# Patient Record
Sex: MALE | Race: WHITE | URBAN - METROPOLITAN AREA
[De-identification: names, ages, dates, MRNs, and addresses within clinical notes are randomized per-mention and may not be internally consistent; named-entity substitution may affect disease eponyms.]

---

## 2017-05-22 ENCOUNTER — OFFICE VISIT (OUTPATIENT)
Dept: FAMILY MEDICINE CLINIC | Age: 52
End: 2017-05-22

## 2017-05-22 VITALS
DIASTOLIC BLOOD PRESSURE: 84 MMHG | BODY MASS INDEX: 26.06 KG/M2 | SYSTOLIC BLOOD PRESSURE: 114 MMHG | HEIGHT: 65 IN | TEMPERATURE: 98.2 F | WEIGHT: 156.4 LBS | HEART RATE: 72 BPM | RESPIRATION RATE: 16 BRPM

## 2017-05-22 DIAGNOSIS — F32.A ANXIETY AND DEPRESSION: ICD-10-CM

## 2017-05-22 DIAGNOSIS — Z12.11 COLON CANCER SCREENING: ICD-10-CM

## 2017-05-22 DIAGNOSIS — F41.9 ANXIETY AND DEPRESSION: ICD-10-CM

## 2017-05-22 DIAGNOSIS — Z11.4 SCREENING FOR HIV (HUMAN IMMUNODEFICIENCY VIRUS): ICD-10-CM

## 2017-05-22 DIAGNOSIS — Z00.00 ANNUAL PHYSICAL EXAM: Primary | ICD-10-CM

## 2017-05-22 DIAGNOSIS — Z23 NEED FOR TDAP VACCINATION: ICD-10-CM

## 2017-05-22 PROCEDURE — 90471 IMMUNIZATION ADMIN: CPT | Performed by: INTERNAL MEDICINE

## 2017-05-22 PROCEDURE — 90715 TDAP VACCINE 7 YRS/> IM: CPT | Performed by: INTERNAL MEDICINE

## 2017-05-22 PROCEDURE — 99396 PREV VISIT EST AGE 40-64: CPT | Performed by: INTERNAL MEDICINE

## 2017-05-22 RX ORDER — DULOXETIN HYDROCHLORIDE 60 MG/1
60 CAPSULE, DELAYED RELEASE ORAL DAILY
COMMUNITY
End: 2022-08-16 | Stop reason: SDUPTHER

## 2017-05-22 RX ORDER — FLUTICASONE PROPIONATE 50 MCG
1 SPRAY, SUSPENSION (ML) NASAL DAILY
Qty: 1 BOTTLE | Refills: 5 | Status: SHIPPED | OUTPATIENT
Start: 2017-05-22

## 2017-05-22 RX ORDER — FLUTICASONE PROPIONATE 50 MCG
1 SPRAY, SUSPENSION (ML) NASAL DAILY
Qty: 3 BOTTLE | Refills: 3 | Status: SHIPPED | OUTPATIENT
Start: 2017-05-22 | End: 2022-08-16

## 2017-05-22 ASSESSMENT — PATIENT HEALTH QUESTIONNAIRE - PHQ9
SUM OF ALL RESPONSES TO PHQ QUESTIONS 1-9: 0
1. LITTLE INTEREST OR PLEASURE IN DOING THINGS: 0
SUM OF ALL RESPONSES TO PHQ9 QUESTIONS 1 & 2: 0
2. FEELING DOWN, DEPRESSED OR HOPELESS: 0

## 2017-05-22 ASSESSMENT — ENCOUNTER SYMPTOMS
GASTROINTESTINAL NEGATIVE: 1
ALLERGIC/IMMUNOLOGIC NEGATIVE: 1
RESPIRATORY NEGATIVE: 1
EYES NEGATIVE: 1

## 2017-05-24 ENCOUNTER — NURSE ONLY (OUTPATIENT)
Dept: FAMILY MEDICINE CLINIC | Age: 52
End: 2017-05-24

## 2017-05-24 DIAGNOSIS — Z11.4 SCREENING FOR HIV (HUMAN IMMUNODEFICIENCY VIRUS): ICD-10-CM

## 2017-05-24 DIAGNOSIS — Z00.00 ANNUAL PHYSICAL EXAM: ICD-10-CM

## 2017-05-24 PROCEDURE — 36415 COLL VENOUS BLD VENIPUNCTURE: CPT | Performed by: INTERNAL MEDICINE

## 2017-05-25 LAB
ALBUMIN SERPL-MCNC: 4.4 G/DL (ref 3.4–5)
ALP BLD-CCNC: 47 U/L (ref 40–129)
ALT SERPL-CCNC: 28 U/L (ref 10–40)
ANION GAP SERPL CALCULATED.3IONS-SCNC: 15 MMOL/L (ref 3–16)
AST SERPL-CCNC: 28 U/L (ref 15–37)
BILIRUB SERPL-MCNC: 0.3 MG/DL (ref 0–1)
BILIRUBIN DIRECT: <0.2 MG/DL (ref 0–0.3)
BILIRUBIN, INDIRECT: NORMAL MG/DL (ref 0–1)
BUN BLDV-MCNC: 23 MG/DL (ref 7–20)
CALCIUM SERPL-MCNC: 8.9 MG/DL (ref 8.3–10.6)
CHLORIDE BLD-SCNC: 102 MMOL/L (ref 99–110)
CHOLESTEROL, TOTAL: 192 MG/DL (ref 0–199)
CO2: 24 MMOL/L (ref 21–32)
CREAT SERPL-MCNC: 1 MG/DL (ref 0.9–1.3)
GFR AFRICAN AMERICAN: >60
GFR NON-AFRICAN AMERICAN: >60
GLUCOSE BLD-MCNC: 98 MG/DL (ref 70–99)
HCT VFR BLD CALC: 42.3 % (ref 40.5–52.5)
HDLC SERPL-MCNC: 55 MG/DL (ref 40–60)
HEMOGLOBIN: 14.1 G/DL (ref 13.5–17.5)
LDL CHOLESTEROL CALCULATED: 117 MG/DL
MCH RBC QN AUTO: 32.7 PG (ref 26–34)
MCHC RBC AUTO-ENTMCNC: 33.4 G/DL (ref 31–36)
MCV RBC AUTO: 97.9 FL (ref 80–100)
PDW BLD-RTO: 12.2 % (ref 12.4–15.4)
PHOSPHORUS: 3.2 MG/DL (ref 2.5–4.9)
PLATELET # BLD: 158 K/UL (ref 135–450)
PMV BLD AUTO: 9.9 FL (ref 5–10.5)
POTASSIUM SERPL-SCNC: 4.1 MMOL/L (ref 3.5–5.1)
RBC # BLD: 4.32 M/UL (ref 4.2–5.9)
SODIUM BLD-SCNC: 141 MMOL/L (ref 136–145)
TOTAL PROTEIN: 6.7 G/DL (ref 6.4–8.2)
TRIGL SERPL-MCNC: 100 MG/DL (ref 0–150)
TSH SERPL DL<=0.05 MIU/L-ACNC: 2.23 UIU/ML (ref 0.27–4.2)
VLDLC SERPL CALC-MCNC: 20 MG/DL
WBC # BLD: 4.2 K/UL (ref 4–11)

## 2017-05-26 LAB — HIV-1 AND HIV-2 ANTIBODIES: NEGATIVE

## 2017-08-07 DIAGNOSIS — Z12.11 SCREENING FOR COLON CANCER: Primary | ICD-10-CM

## 2017-08-23 ENCOUNTER — TELEPHONE (OUTPATIENT)
Dept: SURGERY | Age: 52
End: 2017-08-23

## 2017-08-25 ENCOUNTER — HOSPITAL ENCOUNTER (OUTPATIENT)
Dept: SURGERY | Age: 52
Discharge: HOME | End: 2017-08-25
Attending: SURGERY | Admitting: SURGERY

## 2017-08-25 VITALS
HEART RATE: 72 BPM | TEMPERATURE: 98.7 F | HEIGHT: 65 IN | SYSTOLIC BLOOD PRESSURE: 111 MMHG | DIASTOLIC BLOOD PRESSURE: 52 MMHG | BODY MASS INDEX: 26.16 KG/M2 | OXYGEN SATURATION: 98 % | RESPIRATION RATE: 16 BRPM | WEIGHT: 157 LBS

## 2017-08-25 DIAGNOSIS — Z12.11 ENCOUNTER FOR SCREENING FOR MALIGNANT NEOPLASM OF COLON: ICD-10-CM

## 2017-08-25 RX ORDER — LIDOCAINE HYDROCHLORIDE 10 MG/ML
2 INJECTION, SOLUTION INFILTRATION; PERINEURAL
Status: ACTIVE | OUTPATIENT
Start: 2017-08-25 | End: 2017-08-25

## 2017-08-25 RX ORDER — HYDRALAZINE HYDROCHLORIDE 20 MG/ML
5 INJECTION INTRAMUSCULAR; INTRAVENOUS EVERY 10 MIN PRN
Status: DISCONTINUED | OUTPATIENT
Start: 2017-08-25 | End: 2017-08-26 | Stop reason: HOSPADM

## 2017-08-25 RX ORDER — PROMETHAZINE HYDROCHLORIDE 25 MG/ML
6.25 INJECTION, SOLUTION INTRAMUSCULAR; INTRAVENOUS
Status: ACTIVE | OUTPATIENT
Start: 2017-08-25 | End: 2017-08-25

## 2017-08-25 RX ORDER — METOCLOPRAMIDE HYDROCHLORIDE 5 MG/ML
10 INJECTION INTRAMUSCULAR; INTRAVENOUS
Status: ACTIVE | OUTPATIENT
Start: 2017-08-25 | End: 2017-08-25

## 2017-08-25 RX ORDER — OXYCODONE HYDROCHLORIDE 5 MG/1
10 TABLET ORAL PRN
Status: ACTIVE | OUTPATIENT
Start: 2017-08-25 | End: 2017-08-25

## 2017-08-25 RX ORDER — OXYCODONE HYDROCHLORIDE 5 MG/1
5 TABLET ORAL PRN
Status: ACTIVE | OUTPATIENT
Start: 2017-08-25 | End: 2017-08-25

## 2017-08-25 RX ORDER — LABETALOL HYDROCHLORIDE 5 MG/ML
5 INJECTION, SOLUTION INTRAVENOUS EVERY 10 MIN PRN
Status: DISCONTINUED | OUTPATIENT
Start: 2017-08-25 | End: 2017-08-26 | Stop reason: HOSPADM

## 2017-08-25 RX ORDER — MEPERIDINE HYDROCHLORIDE 50 MG/ML
12.5 INJECTION INTRAMUSCULAR; INTRAVENOUS; SUBCUTANEOUS EVERY 5 MIN PRN
Status: DISCONTINUED | OUTPATIENT
Start: 2017-08-25 | End: 2017-08-26 | Stop reason: HOSPADM

## 2017-08-25 RX ORDER — SODIUM CHLORIDE, SODIUM LACTATE, POTASSIUM CHLORIDE, CALCIUM CHLORIDE 600; 310; 30; 20 MG/100ML; MG/100ML; MG/100ML; MG/100ML
INJECTION, SOLUTION INTRAVENOUS CONTINUOUS
Status: DISCONTINUED | OUTPATIENT
Start: 2017-08-25 | End: 2017-08-26 | Stop reason: HOSPADM

## 2017-08-25 RX ORDER — SODIUM CHLORIDE, SODIUM LACTATE, POTASSIUM CHLORIDE, CALCIUM CHLORIDE 600; 310; 30; 20 MG/100ML; MG/100ML; MG/100ML; MG/100ML
INJECTION, SOLUTION INTRAVENOUS ONCE
Status: COMPLETED | OUTPATIENT
Start: 2017-08-25 | End: 2017-08-25

## 2017-08-25 RX ORDER — LIDOCAINE HYDROCHLORIDE 10 MG/ML
0.1 INJECTION, SOLUTION EPIDURAL; INFILTRATION; INTRACAUDAL; PERINEURAL
Status: ACTIVE | OUTPATIENT
Start: 2017-08-25 | End: 2017-08-25

## 2017-08-25 RX ORDER — MORPHINE SULFATE 2 MG/ML
1 INJECTION, SOLUTION INTRAMUSCULAR; INTRAVENOUS EVERY 5 MIN PRN
Status: DISCONTINUED | OUTPATIENT
Start: 2017-08-25 | End: 2017-08-26 | Stop reason: HOSPADM

## 2017-08-25 RX ORDER — DIPHENHYDRAMINE HYDROCHLORIDE 50 MG/ML
12.5 INJECTION INTRAMUSCULAR; INTRAVENOUS
Status: ACTIVE | OUTPATIENT
Start: 2017-08-25 | End: 2017-08-25

## 2017-08-25 RX ADMIN — SODIUM CHLORIDE, SODIUM LACTATE, POTASSIUM CHLORIDE, CALCIUM CHLORIDE: 600; 310; 30; 20 INJECTION, SOLUTION INTRAVENOUS at 12:38

## 2017-08-25 ASSESSMENT — PAIN SCALES - GENERAL
PAINLEVEL_OUTOF10: 0

## 2017-08-25 ASSESSMENT — PAIN - FUNCTIONAL ASSESSMENT: PAIN_FUNCTIONAL_ASSESSMENT: 0-10

## 2017-08-30 ENCOUNTER — TELEPHONE (OUTPATIENT)
Dept: SURGERY | Age: 52
End: 2017-08-30

## 2017-12-05 ENCOUNTER — OFFICE VISIT (OUTPATIENT)
Dept: FAMILY MEDICINE CLINIC | Age: 52
End: 2017-12-05

## 2017-12-05 VITALS
SYSTOLIC BLOOD PRESSURE: 112 MMHG | WEIGHT: 163.4 LBS | RESPIRATION RATE: 16 BRPM | OXYGEN SATURATION: 97 % | HEART RATE: 78 BPM | DIASTOLIC BLOOD PRESSURE: 64 MMHG | HEIGHT: 65 IN | BODY MASS INDEX: 27.22 KG/M2

## 2017-12-05 DIAGNOSIS — H93.13 TINNITUS OF BOTH EARS: ICD-10-CM

## 2017-12-05 PROCEDURE — 99213 OFFICE O/P EST LOW 20 MIN: CPT | Performed by: INTERNAL MEDICINE

## 2017-12-05 ASSESSMENT — ENCOUNTER SYMPTOMS
SINUS PRESSURE: 0
SORE THROAT: 0
FACIAL SWELLING: 0
VOICE CHANGE: 0
GASTROINTESTINAL NEGATIVE: 1
SINUS PAIN: 0
RESPIRATORY NEGATIVE: 1
TROUBLE SWALLOWING: 0
ALLERGIC/IMMUNOLOGIC NEGATIVE: 1
RHINORRHEA: 0

## 2017-12-05 NOTE — PROGRESS NOTES
Subjective:      Patient ID: Momo Garcia is a 50 y.o. male. HPI  Tinnitus of both ears  Chronic bilateral. Comes and goes in severity. May be some hearing loss associated w/ Tinnitus. No problems w/ balance. Now has to early teen children w/ Tinnitus. Past Medical History:   Diagnosis Date    Allergic rhinitis     Cancer (HCC)     basal cell skin    PONV (postoperative nausea and vomiting)     Psoriasis      Current Outpatient Prescriptions   Medication Sig Dispense Refill    fluticasone (FLONASE) 50 MCG/ACT nasal spray 1 spray by Nasal route daily (Patient taking differently: 1 spray by Nasal route as needed ) 1 Bottle 5    fluticasone (FLONASE) 50 MCG/ACT nasal spray 1 spray by Nasal route daily (Patient taking differently: 1 spray by Nasal route as needed ) 3 Bottle 3    DULoxetine (CYMBALTA) 60 MG extended release capsule Take 60 mg by mouth daily      econazole nitrate 1 % cream Apply topically daily Apply topically daily.  Hydrocortisone Butyrate 0.1 % CREA Apply topically daily       naphazoline-pheniramine (NAPHCON-A) 0.025-0.3 % ophthalmic solution Place 1 drop into both eyes as needed       cetirizine (ZYRTEC) 10 MG tablet Take 10 mg by mouth daily      Cholecalciferol (VITAMIN D) 2000 UNITS CAPS capsule Take by mouth daily       Multiple Vitamins-Minerals (THERAPEUTIC MULTIVITAMIN-MINERALS) tablet Take 1 tablet by mouth daily      Potassium 99 MG TABS Take by mouth daily        No current facility-administered medications for this visit. No Known Allergies  Social History   Substance Use Topics    Smoking status: Never Smoker    Smokeless tobacco: Never Used    Alcohol use No     Family History   Problem Relation Age of Onset    Cancer Father      brain    Heart Disease Father     Arrhythmia Father     Other Maternal Uncle      schizophrenia    Other Maternal Grandfather      schizophrenia    Colon Cancer Mother        Review of Systems   Constitutional: Negative. HENT: Positive for hearing loss and tinnitus. Negative for congestion, dental problem, drooling, ear discharge, ear pain, facial swelling, mouth sores, nosebleeds, postnasal drip, rhinorrhea, sinus pain, sinus pressure, sneezing, sore throat, trouble swallowing and voice change. Respiratory: Negative. Cardiovascular: Negative. Gastrointestinal: Negative. Endocrine: Negative. Genitourinary: Negative. Musculoskeletal: Negative. Allergic/Immunologic: Negative. Neurological: Negative. Hematological: Negative. Psychiatric/Behavioral: Negative. Vitals:    12/05/17 1618   BP: 112/64   Pulse: 78   Resp: 16   SpO2: 97%   Weight: 163 lb 6.4 oz (74.1 kg)   Height: 5' 5\" (1.651 m)     Objective:   Physical Exam   Constitutional: He is oriented to person, place, and time. Vital signs are normal. He appears well-developed and well-nourished. No distress. HENT:   Head: Normocephalic and atraumatic. Right Ear: External ear normal.   Left Ear: External ear normal.   Nose: Nose normal.   Mouth/Throat: Oropharynx is clear and moist. No oropharyngeal exudate. Eyes: Conjunctivae and EOM are normal. Pupils are equal, round, and reactive to light. Neck: Trachea normal, normal range of motion, full passive range of motion without pain and phonation normal. Neck supple. Normal carotid pulses, no hepatojugular reflux and no JVD present. Carotid bruit is not present. No thyroid mass and no thyromegaly present. Cardiovascular: Normal rate, regular rhythm, normal heart sounds, intact distal pulses and normal pulses. No extrasystoles are present. PMI is not displaced. Exam reveals no gallop and no friction rub. No murmur heard. Pulses:       Carotid pulses are 2+ on the right side, and 2+ on the left side. Radial pulses are 2+ on the right side, and 2+ on the left side. Femoral pulses are 2+ on the right side, and 2+ on the left side.        Popliteal pulses are 2+ on the right side, and 2+ on the left side. Dorsalis pedis pulses are 2+ on the right side, and 2+ on the left side. Posterior tibial pulses are 2+ on the right side, and 2+ on the left side. Pulmonary/Chest: Effort normal and breath sounds normal. No accessory muscle usage. No apnea, no tachypnea and no bradypnea. No respiratory distress. He has no decreased breath sounds. He has no wheezes. He has no rhonchi. He has no rales. Abdominal: Normal appearance and normal aorta. There is no hepatosplenomegaly. There is no CVA tenderness. No hernia. Hernia confirmed negative in the ventral area. Neurological: He is alert and oriented to person, place, and time. He has normal strength. He is not disoriented. He displays no atrophy and no tremor. No cranial nerve deficit or sensory deficit. He exhibits normal muscle tone. He displays a negative Romberg sign. Coordination normal.   Skin: Skin is warm, dry and intact. No abrasion and no rash noted. He is not diaphoretic. No cyanosis. No pallor. Nails show no clubbing. Psychiatric: He has a normal mood and affect. His speech is normal and behavior is normal. Judgment and thought content normal. Cognition and memory are normal.       Assessment:      Problem   Tinnitus of Both Ears. Chronic. Plan:      All above problems reviewed and the found to be unchanged except for the following :     Tinnitus of Both Ears. Will refer to ENT.

## 2017-12-05 NOTE — ASSESSMENT & PLAN NOTE
Chronic bilateral. Comes and goes in severity. May be some hearing loss associated w/ Tinnitus. No problems w/ balance. Now has to early teen children w/ Tinnitus.

## 2018-09-26 PROBLEM — Z00.00 ANNUAL PHYSICAL EXAM: Status: RESOLVED | Noted: 2017-05-22 | Resolved: 2018-09-26

## 2018-12-07 ENCOUNTER — OFFICE VISIT (OUTPATIENT)
Dept: FAMILY MEDICINE CLINIC | Age: 53
End: 2018-12-07
Payer: COMMERCIAL

## 2018-12-07 VITALS
HEIGHT: 65 IN | WEIGHT: 158 LBS | TEMPERATURE: 98.2 F | HEART RATE: 60 BPM | SYSTOLIC BLOOD PRESSURE: 112 MMHG | OXYGEN SATURATION: 98 % | DIASTOLIC BLOOD PRESSURE: 78 MMHG | RESPIRATION RATE: 16 BRPM | BODY MASS INDEX: 26.33 KG/M2

## 2018-12-07 DIAGNOSIS — J01.00 ACUTE NON-RECURRENT MAXILLARY SINUSITIS: ICD-10-CM

## 2018-12-07 PROCEDURE — 99212 OFFICE O/P EST SF 10 MIN: CPT | Performed by: INTERNAL MEDICINE

## 2018-12-07 ASSESSMENT — ENCOUNTER SYMPTOMS
COUGH: 1
SINUS PRESSURE: 1
RHINORRHEA: 1
GASTROINTESTINAL NEGATIVE: 1

## 2018-12-07 ASSESSMENT — PATIENT HEALTH QUESTIONNAIRE - PHQ9
SUM OF ALL RESPONSES TO PHQ9 QUESTIONS 1 & 2: 0
2. FEELING DOWN, DEPRESSED OR HOPELESS: 0
SUM OF ALL RESPONSES TO PHQ QUESTIONS 1-9: 0
1. LITTLE INTEREST OR PLEASURE IN DOING THINGS: 0
SUM OF ALL RESPONSES TO PHQ QUESTIONS 1-9: 0

## 2018-12-21 RX ORDER — PROMETHAZINE HYDROCHLORIDE 25 MG/1
25 TABLET ORAL EVERY 8 HOURS PRN
Qty: 10 TABLET | Refills: 0 | Status: SHIPPED | OUTPATIENT
Start: 2018-12-21 | End: 2021-02-26

## 2021-02-26 ENCOUNTER — OFFICE VISIT (OUTPATIENT)
Dept: FAMILY MEDICINE CLINIC | Age: 56
End: 2021-02-26
Payer: COMMERCIAL

## 2021-02-26 VITALS
HEART RATE: 77 BPM | HEIGHT: 65 IN | BODY MASS INDEX: 23.82 KG/M2 | WEIGHT: 143 LBS | SYSTOLIC BLOOD PRESSURE: 118 MMHG | OXYGEN SATURATION: 98 % | RESPIRATION RATE: 16 BRPM | TEMPERATURE: 97.3 F | DIASTOLIC BLOOD PRESSURE: 60 MMHG

## 2021-02-26 DIAGNOSIS — M77.11 RIGHT LATERAL EPICONDYLITIS: ICD-10-CM

## 2021-02-26 DIAGNOSIS — G89.29 CHRONIC RIGHT SHOULDER PAIN: ICD-10-CM

## 2021-02-26 DIAGNOSIS — R53.83 FATIGUE, UNSPECIFIED TYPE: ICD-10-CM

## 2021-02-26 DIAGNOSIS — F32.A ANXIETY AND DEPRESSION: ICD-10-CM

## 2021-02-26 DIAGNOSIS — J30.1 SEASONAL ALLERGIC RHINITIS DUE TO POLLEN: ICD-10-CM

## 2021-02-26 DIAGNOSIS — M25.511 CHRONIC RIGHT SHOULDER PAIN: ICD-10-CM

## 2021-02-26 DIAGNOSIS — Z11.59 NEED FOR HEPATITIS C SCREENING TEST: ICD-10-CM

## 2021-02-26 DIAGNOSIS — Z12.5 SCREENING FOR PROSTATE CANCER: ICD-10-CM

## 2021-02-26 DIAGNOSIS — F41.9 ANXIETY AND DEPRESSION: ICD-10-CM

## 2021-02-26 DIAGNOSIS — E78.00 PURE HYPERCHOLESTEROLEMIA: ICD-10-CM

## 2021-02-26 DIAGNOSIS — Z00.00 ANNUAL PHYSICAL EXAM: Primary | ICD-10-CM

## 2021-02-26 DIAGNOSIS — L40.9 PSORIASIS: ICD-10-CM

## 2021-02-26 PROCEDURE — 99396 PREV VISIT EST AGE 40-64: CPT | Performed by: INTERNAL MEDICINE

## 2021-02-26 SDOH — ECONOMIC STABILITY: TRANSPORTATION INSECURITY
IN THE PAST 12 MONTHS, HAS LACK OF TRANSPORTATION KEPT YOU FROM MEETINGS, WORK, OR FROM GETTING THINGS NEEDED FOR DAILY LIVING?: NO

## 2021-02-26 SDOH — ECONOMIC STABILITY: TRANSPORTATION INSECURITY
IN THE PAST 12 MONTHS, HAS THE LACK OF TRANSPORTATION KEPT YOU FROM MEDICAL APPOINTMENTS OR FROM GETTING MEDICATIONS?: NO

## 2021-02-26 SDOH — ECONOMIC STABILITY: INCOME INSECURITY: HOW HARD IS IT FOR YOU TO PAY FOR THE VERY BASICS LIKE FOOD, HOUSING, MEDICAL CARE, AND HEATING?: NOT HARD AT ALL

## 2021-02-26 ASSESSMENT — ENCOUNTER SYMPTOMS
EYES NEGATIVE: 1
RESPIRATORY NEGATIVE: 1
ALLERGIC/IMMUNOLOGIC NEGATIVE: 1
GASTROINTESTINAL NEGATIVE: 1

## 2021-02-26 NOTE — ASSESSMENT & PLAN NOTE
Prostate: today  Colonoscopy: 8/2018. DEXA: na  Eye: past due. Hearing: passed in office. Immunization: up to date.  Rx for Shingrix   MMSE: na  Get Up and Go: na  Tob: nonsmoker/never  ETOH: rare  Caffeine: moderate  Cardiac Risk Assessment: labs pending  Living will: no  Medical power of : no

## 2021-02-26 NOTE — PATIENT INSTRUCTIONS
Annual Physical Exam. shingrix vaccine in 1 month. Chronic Right Shoulder Pain. Good ROM w/o pain today. To ORtho if worsens. Right Lateral Epicondylitis. Mild. Wear protective band, use Ice and Tumeric. Anxiety and Depression. Continue present meds. Call if new c/o. Seasonal Allergic Rhinitis Due to Pollen. Claritin, Neti pot, Flonase as needed. Psoriasis. Continue topical treatment. To Derm as scheduled. Prostate: today  Colonoscopy: 8/2018. DEXA: na  Eye: past due. Hearing: passed in office. Immunization: up to date.  Rx for Shingrix   MMSE: na  Get Up and Go: na  Tob: nonsmoker/never  ETOH: rare  Caffeine: moderate  Cardiac Risk Assessment: labs pending  Living will: no  Medical power of : no

## 2021-02-26 NOTE — PROGRESS NOTES
Subjective:      Patient ID: Philip Fontana is a 49 y.o. male. HPI  Annual physical exam  Prostate: today  Colonoscopy: 8/2018. DEXA: na  Eye: past due. Hearing: passed in office. Immunization: up to date. Rx for Shingrix   MMSE: na  Get Up and Go: na  Tob: nonsmoker/never  ETOH: rare  Caffeine: moderate  Cardiac Risk Assessment: labs pending  Living will: no  Medical power of : no    Chronic right shoulder pain  Impingement symptoms. Has seen Chiropractor. Doing exercise/stretching. Comes and goes. Started ~2 yrs ago. Has been some better lately w/ Gx. Right lateral epicondylitis  Started several months ago. No real change. Seasonal allergic rhinitis due to pollen  Spring and fall. Does well w/ present OTCs. Anxiety and depression  Stable . Sees Psych. No recent chx in med. Psoriasis  Stable. No recent chx in med. Rectum, penis, and fingernails. Review of Systems   Constitutional: Positive for fatigue. HENT: Negative. Eyes: Negative. Respiratory: Negative. Cardiovascular: Negative. Gastrointestinal: Negative. Endocrine: Negative. Genitourinary: Negative. Musculoskeletal: Positive for arthralgias and myalgias. Skin: Negative. Allergic/Immunologic: Negative. Neurological: Negative. Hematological: Negative. Psychiatric/Behavioral: Positive for dysphoric mood. Vitals:    02/26/21 1404 02/26/21 1439   BP: 104/70 118/60   Pulse: 77    Resp: 16    Temp: 97.3 °F (36.3 °C)    SpO2: 98%    Weight: 143 lb (64.9 kg)    Height: 5' 5\" (1.651 m)      Objective:   Physical Exam  Constitutional:       General: He is not in acute distress. Appearance: Normal appearance. He is well-developed and normal weight. He is not ill-appearing, toxic-appearing or diaphoretic. HENT:      Head: Normocephalic and atraumatic. Right Ear: Hearing, tympanic membrane, ear canal and external ear normal. There is no impacted cerumen.       Left Ear: Hearing, tympanic Pulmonary:      Effort: Pulmonary effort is normal. No tachypnea, bradypnea, accessory muscle usage or respiratory distress. Breath sounds: Normal breath sounds. No stridor. No decreased breath sounds, wheezing, rhonchi or rales. Chest:      Chest wall: No tenderness. Abdominal:      General: Bowel sounds are normal. There is no distension or abdominal bruit. Palpations: Abdomen is soft. There is no shifting dullness, fluid wave, mass or pulsatile mass. Tenderness: There is no abdominal tenderness. There is no right CVA tenderness, left CVA tenderness, guarding or rebound. Hernia: No hernia is present. There is no hernia in the ventral area or left inguinal area. Genitourinary:     Penis: Normal. No tenderness. Testes: Normal. Cremasteric reflex is present. Prostate: Normal.      Rectum: Normal. Guaiac result negative. Musculoskeletal: Normal range of motion. General: Tenderness (R shoulder and elbow mild discomfort.) present. No swelling, deformity or signs of injury. Right lower leg: No edema. Left lower leg: No edema. Lymphadenopathy:      Head:      Right side of head: No submental, submandibular, tonsillar, preauricular, posterior auricular or occipital adenopathy. Left side of head: No submental, submandibular, tonsillar, preauricular, posterior auricular or occipital adenopathy. Cervical: No cervical adenopathy. Upper Body:      Right upper body: No supraclavicular or epitrochlear adenopathy. Left upper body: No supraclavicular or epitrochlear adenopathy. Skin:     General: Skin is warm and dry. Capillary Refill: Capillary refill takes less than 2 seconds. Coloration: Skin is not jaundiced or pale. Findings: No abrasion, bruising, erythema, lesion or rash. Nails: There is no clubbing. Comments: Mild Psoriasis of perianal area, penis, and nails. Neurological:      General: No focal deficit present. Mental Status: He is alert and oriented to person, place, and time. Mental status is at baseline. He is not disoriented. Cranial Nerves: No cranial nerve deficit. Sensory: No sensory deficit. Motor: No weakness, tremor, atrophy, abnormal muscle tone or seizure activity. Coordination: Coordination normal.      Gait: Gait normal.      Deep Tendon Reflexes: Reflexes are normal and symmetric. Reflexes normal. Babinski sign absent on the right side. Babinski sign absent on the left side. Reflex Scores:       Tricep reflexes are 2+ on the right side and 2+ on the left side. Bicep reflexes are 2+ on the right side and 2+ on the left side. Brachioradialis reflexes are 2+ on the right side and 2+ on the left side. Patellar reflexes are 2+ on the right side and 2+ on the left side. Achilles reflexes are 2+ on the right side and 2+ on the left side. Psychiatric:         Mood and Affect: Mood normal.         Speech: Speech normal.         Behavior: Behavior normal.         Thought Content: Thought content normal.         Judgment: Judgment normal.         Assessment / Plan:     Annual Physical Exam. shingrix vaccine in 1 month. Chronic Right Shoulder Pain. Good ROM w/o pain today. To ORtho if worsens. Right Lateral Epicondylitis. Mild. Wear protective band, use Ice and Tumeric. Anxiety and Depression. Continue present meds. Call if new c/o. Seasonal Allergic Rhinitis Due to Pollen. Claritin, Neti pot, Flonase as needed. Psoriasis. Continue topical treatment. To Derm as scheduled.

## 2021-02-26 NOTE — ASSESSMENT & PLAN NOTE
Impingement symptoms. Has seen Chiropractor. Doing exercise/stretching. Comes and goes. Started ~2 yrs ago.  Has been some better lately w/ Gx.

## 2021-03-02 ENCOUNTER — NURSE ONLY (OUTPATIENT)
Dept: FAMILY MEDICINE CLINIC | Age: 56
End: 2021-03-02
Payer: COMMERCIAL

## 2021-03-02 DIAGNOSIS — E78.00 PURE HYPERCHOLESTEROLEMIA: ICD-10-CM

## 2021-03-02 DIAGNOSIS — Z12.5 SCREENING FOR PROSTATE CANCER: ICD-10-CM

## 2021-03-02 DIAGNOSIS — Z11.59 NEED FOR HEPATITIS C SCREENING TEST: ICD-10-CM

## 2021-03-02 DIAGNOSIS — Z00.00 ANNUAL PHYSICAL EXAM: ICD-10-CM

## 2021-03-02 DIAGNOSIS — R53.83 FATIGUE, UNSPECIFIED TYPE: ICD-10-CM

## 2021-03-02 LAB
ALBUMIN SERPL-MCNC: 4.4 G/DL (ref 3.4–5)
ALP BLD-CCNC: 53 U/L (ref 40–129)
ALT SERPL-CCNC: 16 U/L (ref 10–40)
ANION GAP SERPL CALCULATED.3IONS-SCNC: 11 MMOL/L (ref 3–16)
AST SERPL-CCNC: 17 U/L (ref 15–37)
BILIRUB SERPL-MCNC: <0.2 MG/DL (ref 0–1)
BILIRUBIN DIRECT: <0.2 MG/DL (ref 0–0.3)
BILIRUBIN, INDIRECT: NORMAL MG/DL (ref 0–1)
BUN BLDV-MCNC: 17 MG/DL (ref 7–20)
CALCIUM SERPL-MCNC: 9 MG/DL (ref 8.3–10.6)
CHLORIDE BLD-SCNC: 107 MMOL/L (ref 99–110)
CHOLESTEROL, TOTAL: 230 MG/DL (ref 0–199)
CO2: 24 MMOL/L (ref 21–32)
CREAT SERPL-MCNC: 0.8 MG/DL (ref 0.9–1.3)
GFR AFRICAN AMERICAN: >60
GFR NON-AFRICAN AMERICAN: >60
GLUCOSE BLD-MCNC: 97 MG/DL (ref 70–99)
HCT VFR BLD CALC: 39.7 % (ref 40.5–52.5)
HDLC SERPL-MCNC: 55 MG/DL (ref 40–60)
HEMOGLOBIN: 13.9 G/DL (ref 13.5–17.5)
HEPATITIS C ANTIBODY INTERPRETATION: NORMAL
LDL CHOLESTEROL CALCULATED: 157 MG/DL
MCH RBC QN AUTO: 34.3 PG (ref 26–34)
MCHC RBC AUTO-ENTMCNC: 34.8 G/DL (ref 31–36)
MCV RBC AUTO: 98.4 FL (ref 80–100)
PDW BLD-RTO: 12.6 % (ref 12.4–15.4)
PHOSPHORUS: 3.5 MG/DL (ref 2.5–4.9)
PLATELET # BLD: 163 K/UL (ref 135–450)
PMV BLD AUTO: 9.4 FL (ref 5–10.5)
POTASSIUM SERPL-SCNC: 4.5 MMOL/L (ref 3.5–5.1)
RBC # BLD: 4.04 M/UL (ref 4.2–5.9)
SODIUM BLD-SCNC: 142 MMOL/L (ref 136–145)
TOTAL PROTEIN: 6.9 G/DL (ref 6.4–8.2)
TRIGL SERPL-MCNC: 92 MG/DL (ref 0–150)
VLDLC SERPL CALC-MCNC: 18 MG/DL
WBC # BLD: 4.8 K/UL (ref 4–11)

## 2021-03-02 PROCEDURE — 36415 COLL VENOUS BLD VENIPUNCTURE: CPT | Performed by: INTERNAL MEDICINE

## 2021-03-03 LAB
PROSTATE SPECIFIC ANTIGEN: 0.93 NG/ML (ref 0–4)
TSH SERPL DL<=0.05 MIU/L-ACNC: 2.09 UIU/ML (ref 0.27–4.2)

## 2021-03-28 PROBLEM — Z00.00 ANNUAL PHYSICAL EXAM: Status: RESOLVED | Noted: 2017-05-22 | Resolved: 2021-03-28

## 2022-01-05 ENCOUNTER — TELEPHONE (OUTPATIENT)
Dept: FAMILY MEDICINE CLINIC | Age: 57
End: 2022-01-05

## 2022-01-05 NOTE — TELEPHONE ENCOUNTER
----- Message from Power Violette sent at 1/5/2022 11:55 AM EST -----  Subject: Message to Provider    QUESTIONS  Information for Provider? Pt would like to speak to the nurse about the   options of getting a home covid test. Pt stated that he is not hurry, but   would like to know the quickest way to get one. Pls give pt a call  ---------------------------------------------------------------------------  --------------  CALL BACK INFO  What is the best way for the office to contact you? OK to leave message on   voicemail  Preferred Call Back Phone Number?  [phone]  ---------------------------------------------------------------------------  --------------  SCRIPT ANSWERS  undefined

## 2022-02-14 ENCOUNTER — OFFICE VISIT (OUTPATIENT)
Dept: FAMILY MEDICINE CLINIC | Age: 57
End: 2022-02-14
Payer: COMMERCIAL

## 2022-02-14 VITALS
HEIGHT: 65 IN | SYSTOLIC BLOOD PRESSURE: 122 MMHG | OXYGEN SATURATION: 96 % | HEART RATE: 79 BPM | DIASTOLIC BLOOD PRESSURE: 78 MMHG | WEIGHT: 157.8 LBS | BODY MASS INDEX: 26.29 KG/M2

## 2022-02-14 DIAGNOSIS — R22.1 NECK MASS: ICD-10-CM

## 2022-02-14 DIAGNOSIS — F32.A ANXIETY AND DEPRESSION: ICD-10-CM

## 2022-02-14 DIAGNOSIS — L40.9 PSORIASIS: ICD-10-CM

## 2022-02-14 DIAGNOSIS — Z76.89 ENCOUNTER TO ESTABLISH CARE: Primary | ICD-10-CM

## 2022-02-14 DIAGNOSIS — F41.9 ANXIETY AND DEPRESSION: ICD-10-CM

## 2022-02-14 PROBLEM — J01.00 ACUTE NON-RECURRENT MAXILLARY SINUSITIS: Status: RESOLVED | Noted: 2018-12-07 | Resolved: 2022-02-14

## 2022-02-14 PROCEDURE — 99203 OFFICE O/P NEW LOW 30 MIN: CPT | Performed by: STUDENT IN AN ORGANIZED HEALTH CARE EDUCATION/TRAINING PROGRAM

## 2022-02-14 ASSESSMENT — PATIENT HEALTH QUESTIONNAIRE - PHQ9
2. FEELING DOWN, DEPRESSED OR HOPELESS: 0
3. TROUBLE FALLING OR STAYING ASLEEP: 0
8. MOVING OR SPEAKING SO SLOWLY THAT OTHER PEOPLE COULD HAVE NOTICED. OR THE OPPOSITE, BEING SO FIGETY OR RESTLESS THAT YOU HAVE BEEN MOVING AROUND A LOT MORE THAN USUAL: 0
SUM OF ALL RESPONSES TO PHQ QUESTIONS 1-9: 0
SUM OF ALL RESPONSES TO PHQ9 QUESTIONS 1 & 2: 0
10. IF YOU CHECKED OFF ANY PROBLEMS, HOW DIFFICULT HAVE THESE PROBLEMS MADE IT FOR YOU TO DO YOUR WORK, TAKE CARE OF THINGS AT HOME, OR GET ALONG WITH OTHER PEOPLE: 0
9. THOUGHTS THAT YOU WOULD BE BETTER OFF DEAD, OR OF HURTING YOURSELF: 0
1. LITTLE INTEREST OR PLEASURE IN DOING THINGS: 0
4. FEELING TIRED OR HAVING LITTLE ENERGY: 0
SUM OF ALL RESPONSES TO PHQ QUESTIONS 1-9: 0
SUM OF ALL RESPONSES TO PHQ QUESTIONS 1-9: 0
6. FEELING BAD ABOUT YOURSELF - OR THAT YOU ARE A FAILURE OR HAVE LET YOURSELF OR YOUR FAMILY DOWN: 0
5. POOR APPETITE OR OVEREATING: 0
7. TROUBLE CONCENTRATING ON THINGS, SUCH AS READING THE NEWSPAPER OR WATCHING TELEVISION: 0
SUM OF ALL RESPONSES TO PHQ QUESTIONS 1-9: 0

## 2022-02-14 ASSESSMENT — ANXIETY QUESTIONNAIRES
5. BEING SO RESTLESS THAT IT IS HARD TO SIT STILL: 0-NOT AT ALL
6. BECOMING EASILY ANNOYED OR IRRITABLE: 0-NOT AT ALL
1. FEELING NERVOUS, ANXIOUS, OR ON EDGE: 0-NOT AT ALL
4. TROUBLE RELAXING: 0-NOT AT ALL
7. FEELING AFRAID AS IF SOMETHING AWFUL MIGHT HAPPEN: 0-NOT AT ALL
GAD7 TOTAL SCORE: 0
2. NOT BEING ABLE TO STOP OR CONTROL WORRYING: 0-NOT AT ALL
3. WORRYING TOO MUCH ABOUT DIFFERENT THINGS: 0-NOT AT ALL

## 2022-02-14 NOTE — PROGRESS NOTES
2022    Bill Osuna (:  1965) is a 56 y.o. male, here for evaluation of the following medical concerns:    Chief Complaint   Patient presents with    New Patient     establishment     Anxiety     feels that he is in remission wants to discuss     Depression     wants primary to manage meds for depression and anxeity     Skin Problem     has recently had a basal cell removed and has another spot he is also concened about. HPI    Anxiety and Depression  Follows with Psych previously  Cymbalta 60mg daily  lamotrigene 100mg daily  Has tried weaning in the past and had recurence of symptoms  Talking with therapist every 6 months  Feels he is in remission    Bump on chin/neck  Feels this has been present forever  Oscar Fraire mentioned having checked  NO change in size, not painful    Psoriasis   Rectum, penis, and fingernails  Stable on cream medications  Symptom free for last few months  Following with derm    Had basal cell removed from nasal bridge 21      Review of Systems  All other systems reviewed and negative    Prior to Visit Medications    Medication Sig Taking? Authorizing Provider   L-Methylfolate-Algae (DEPLIN 15 PO) Take by mouth  Historical Provider, MD   fluticasone (FLONASE) 50 MCG/ACT nasal spray 1 spray by Nasal route daily  Patient taking differently: 1 spray by Nasal route as needed   Conchis Thorpe MD   fluticasone (FLONASE) 50 MCG/ACT nasal spray 1 spray by Nasal route daily  Patient taking differently: 1 spray by Nasal route as needed   Conchis Thorpe MD   DULoxetine (CYMBALTA) 60 MG extended release capsule Take 60 mg by mouth daily  Historical Provider, MD   econazole nitrate 1 % cream Apply topically daily Apply topically daily.   Historical Provider, MD   Hydrocortisone Butyrate 0.1 % CREA Apply topically daily   Historical Provider, MD   naphazoline-pheniramine (NAPHCON-A) 0.025-0.3 % ophthalmic solution Place 1 drop into both eyes as needed   Historical Provider, MD   cetirizine (ZYRTEC) 10 MG tablet Take 10 mg by mouth daily  Historical Provider, MD   Multiple Vitamins-Minerals (THERAPEUTIC MULTIVITAMIN-MINERALS) tablet Take 1 tablet by mouth daily  Historical Provider, MD   Potassium 99 MG TABS Take by mouth daily   Historical Provider, MD        No Known Allergies    Past Medical History:   Diagnosis Date    Allergic rhinitis     Cancer ([address]     basal cell skin    PONV (postoperative nausea and vomiting)     Psoriasis        Past Surgical History:   Procedure Laterality Date    APPENDECTOMY      COLONOSCOPY  2007    COLONOSCOPY  08/25/2017   [facility] MOHS SURGERY  2009    VASECTOMY      WISDOM TOOTH EXTRACTION         Social History     Socioeconomic History    Marital status:      Spouse name: Not on file    Number of children: Not on file    Years of education: Not on file    Highest education level: Not on file   Occupational History    Not on file   Tobacco Use    Smoking status: Never Smoker    Smokeless tobacco: Never Used   Substance and Sexual Activity    Alcohol use: No    Drug use: No    Sexual activity: Never   Other Topics Concern    Not on file   Social History Narrative    Not on file     Social Determinants of Health     Financial Resource Strain: Low Risk     Difficulty of Paying Living Expenses: Not hard at all   Food Insecurity: No Food Insecurity    Worried About Running Out of Food in the Last Year: Never true    Mayank of Food in the Last Year: Never true   Transportation Needs: No Transportation Needs    Lack of Transportation (Medical): No    Lack of Transportation (Non-Medical):  No   Physical Activity:     Days of Exercise per Week: Not on file    Minutes of Exercise per Session: Not on file   Stress:     Feeling of Stress : Not on file   Social Connections:     Frequency of Communication with Friends and Family: Not on file    Frequency of Social Gatherings with Friends and Family: Not on file    Attends Congregational Services: Not on file    Active Member of Clubs or Organizations: Not on file    Attends Club or Organization Meetings: Not on file    Marital Status: Not on file   Intimate Partner Violence:     Fear of Current or Ex-Partner: Not on file    Emotionally Abused: Not on file    Physically Abused: Not on file    Sexually Abused: Not on file   Housing Stability:     Unable to Pay for Housing in the Last Year: Not on file    Number of [place] in the Last Year: Not on file    Unstable Housing in the Last Year: Not on file        Family History   Problem Relation Age of Onset    Cancer Father         brain    Heart Disease Father     Arrhythmia Father     Other Maternal Uncle         schizophrenia    Other Maternal Grandfather         schizophrenia    Colon Cancer Mother        Vitals:    02/14/22 1018   BP: 122/78   Pulse: 79   SpO2: 96%   Weight: 157 lb 12.8 oz (71.6 kg)   Height: 5' 5\" (1.651 m)     Estimated body mass index is 26.26 kg/m² as calculated from the following:    Height as of this encounter: 5' 5\" (1.651 m). Weight as of this encounter: 157 lb 12.8 oz (71.6 kg). Physical Exam  Vitals reviewed. Constitutional:       General: He is not in acute distress. Appearance: Normal appearance. He is not ill-appearing. HENT:      Head: Normocephalic and atraumatic. Right Ear: Tympanic membrane normal.      Left Ear: Tympanic membrane normal.      Mouth/Throat:      Comments: Firm mass in midline of neck that feels connected to cricoid cartilage and moves with cricoid movement. Non-painful  Eyes:      Extraocular Movements: Extraocular movements intact. Conjunctiva/sclera: Conjunctivae normal.   Cardiovascular:      Rate and Rhythm: Normal rate and regular rhythm. Pulses: Normal pulses. Heart sounds: Normal heart sounds. No murmur heard. Pulmonary:      Effort: Pulmonary effort is normal.      Breath sounds: Normal breath sounds.    Abdominal:      General: Abdomen is flat. Bowel sounds are normal. There is no distension. Palpations: Abdomen is soft. Tenderness: There is no abdominal tenderness. Musculoskeletal:         General: No swelling. Normal range of motion. Right lower leg: No edema. Left lower leg: No edema. Skin:     General: Skin is warm and dry. Capillary Refill: Capillary refill takes less than 2 seconds. Findings: No rash. Neurological:      General: No focal deficit present. Mental Status: He is alert and oriented to person, place, and time. Psychiatric:         Mood and Affect: Mood normal.         Behavior: Behavior normal.         Thought Content: Thought content normal.         Judgment: Judgment normal.         ASSESSMENT/PLAN:  1. Encounter to establish care  Previously following with Dr. Theresa Holter    2. Psoriasis  Following with Derm and controlled on topical creams. 3. Anxiety and depression  Reports well controlled on Cymbalta and lamotrigene. Discussed keeping current dose for now and will take over prescription unless needing adjustment. 4. Neck mass  Exam consistent with abnormality of cricoid cartilage. Will refer to ENT for further evaluation and determintation of next best imaging if needed. - Leah Stephens MD, Otolaryngology, [facility]      No follow-ups on file. An  electronic signature was used to authenticate this note.     --Ayaka Angel,  on 2/14/2022 at 12:42 PM

## 2022-02-18 ENCOUNTER — OFFICE VISIT (OUTPATIENT)
Dept: ENT CLINIC | Age: 57
End: 2022-02-18
Payer: COMMERCIAL

## 2022-02-18 VITALS — TEMPERATURE: 97.9 F | HEIGHT: 65 IN | WEIGHT: 157 LBS | BODY MASS INDEX: 26.16 KG/M2

## 2022-02-18 DIAGNOSIS — H93.13 TINNITUS OF BOTH EARS: ICD-10-CM

## 2022-02-18 DIAGNOSIS — R22.1 NECK MASS: ICD-10-CM

## 2022-02-18 DIAGNOSIS — J30.9 ALLERGIC RHINITIS, UNSPECIFIED SEASONALITY, UNSPECIFIED TRIGGER: Primary | ICD-10-CM

## 2022-02-18 PROCEDURE — 99204 OFFICE O/P NEW MOD 45 MIN: CPT | Performed by: STUDENT IN AN ORGANIZED HEALTH CARE EDUCATION/TRAINING PROGRAM

## 2022-02-18 RX ORDER — AZELASTINE 1 MG/ML
1 SPRAY, METERED NASAL 2 TIMES DAILY
Qty: 30 ML | Refills: 3 | Status: SHIPPED | OUTPATIENT
Start: 2022-02-18

## 2022-02-18 ASSESSMENT — ENCOUNTER SYMPTOMS
NAUSEA: 0
VOMITING: 0
EYE PAIN: 0
SHORTNESS OF BREATH: 0
COUGH: 0
RHINORRHEA: 0

## 2022-02-18 NOTE — PROGRESS NOTES
[facility]      Patient Name: Macy Tyler  Medical Record Number:  [id number]  Primary Care Physician:  Karina Honeycutt DO  Date of Consultation: 2/18/2022    Chief Complaint:   Chief Complaint   Patient presents with    New Patient     Patient states he has a lump on his neck. He is unsure of how long it has been there. Patient states that he also has tinnitus and a deviated septum. He has also done allergy injections in the past        [facility]  Maykel Lassiter is a(n) 56 y.o. male who presents evaluation of a lump on his neck. He states is been there for many years and has not changed. His franco noticed it and he is concerned and would like it checked out. He also has seasonal allergies and has used Flonase and over-the-counter allergy medications with some relief. He was on allergy shots for a long period in his life. He does not have any sinus infections that he complains of. He also has a low ringing in his ears. He has not had a recent hearing test but is not interested in hearing test this point time.       Patient Active Problem List   Diagnosis    Seasonal allergic rhinitis due to pollen    Psoriasis    Anxiety and depression    Tinnitus of both ears    Chronic right shoulder pain    Right lateral epicondylitis     Past Surgical History:   Procedure Laterality Date    APPENDECTOMY      COLONOSCOPY  2007    COLONOSCOPY  08/25/2017   [place] MOHS SURGERY  2009    VASECTOMY      WISDOM TOOTH EXTRACTION       Family History   Problem Relation Age of Onset    Cancer Father         brain    Heart Disease Father     Arrhythmia Father     Other Maternal Uncle         schizophrenia    Other Maternal Grandfather         schizophrenia    Colon Cancer Mother      Social History     Socioeconomic History    Marital status:      Spouse name: Not on file    Number of children: Not on file    Years of education: Not on file  Highest education level: Not on file   Occupational History    Not on file   Tobacco Use    Smoking status: Never Smoker    Smokeless tobacco: Never Used   Substance and Sexual Activity    Alcohol use: No    Drug use: No    Sexual activity: Never   Other Topics Concern    Not on file   Social History Narrative    Not on file     Social Determinants of Health     Financial Resource Strain: Low Risk     Difficulty of Paying Living Expenses: Not hard at all   Food Insecurity: No Food Insecurity    Worried About Running Out of Food in the Last Year: Never true    Mayank of Food in the Last Year: Never true   Transportation Needs: No Transportation Needs    Lack of Transportation (Medical): No    Lack of Transportation (Non-Medical): No   Physical Activity:     Days of Exercise per Week: Not on file    Minutes of Exercise per Session: Not on file   Stress:     Feeling of Stress : Not on file   Social Connections:     Frequency of Communication with Friends and Family: Not on file    Frequency of Social Gatherings with Friends and Family: Not on file    Attends Muslim Services: Not on file    Active Member of [address] [place] [address] or Organizations: Not on file    Attends Club or Organization Meetings: Not on file    Marital Status: Not on file   Intimate Partner Violence:     Fear of Current or Ex-Partner: Not on file    Emotionally Abused: Not on file    Physically Abused: Not on file    Sexually Abused: Not on file   Housing Stability:     Unable to Pay for Housing in the Last Year: Not on file    Number of [place] in the Last Year: Not on file    Unstable Housing in the Last Year: Not on file       DRUG/FOOD ALLERGIES: Patient has no known allergies. CURRENT MEDICATIONS  Prior to Admission medications    Medication Sig Start Date End Date Taking?  Authorizing Provider   azelastine (ASTELIN) 0.1 % nasal spray 1 spray by Nasal route 2 times daily Use in each nostril as directed 2/18/22  Yes [place],    L-Methylfolate-Algae (DEPLIN 15 PO) Take by mouth    Historical Provider, MD   fluticasone (FLONASE) 50 MCG/ACT nasal spray 1 spray by Nasal route daily  Patient taking differently: 1 spray by Nasal route as needed  5/22/17   C Emely Noyola MD   fluticasone (FLONASE) 50 MCG/ACT nasal spray 1 spray by Nasal route daily  Patient taking differently: 1 spray by Nasal route as needed  5/22/17   C Emely Noyola MD   DULoxetine (CYMBALTA) 60 MG extended release capsule Take 60 mg by mouth daily    Historical Provider, MD   econazole nitrate 1 % cream Apply topically daily Apply topically daily. Historical Provider, MD   Hydrocortisone Butyrate 0.1 % CREA Apply topically daily     Historical Provider, MD   naphazoline-pheniramine (NAPHCON-A) 0.025-0.3 % ophthalmic solution Place 1 drop into both eyes as needed     Historical Provider, MD   cetirizine (ZYRTEC) 10 MG tablet Take 10 mg by mouth daily    Historical Provider, MD   Multiple Vitamins-Minerals (THERAPEUTIC MULTIVITAMIN-MINERALS) tablet Take 1 tablet by mouth daily    Historical Provider, MD   Potassium 99 MG TABS Take by mouth daily     Historical Provider, MD       REVIEW OF SYSTEMS  The following systems were reviewed and revealed the following in addition to any already discussed in the HPI:    Review of Systems   Constitutional: Negative for fatigue and fever. HENT: Positive for tinnitus. Negative for congestion, ear pain, postnasal drip, rhinorrhea and sneezing. Eyes: Negative for pain and visual disturbance. Respiratory: Negative for cough and shortness of breath. Cardiovascular: Negative for chest pain. Gastrointestinal: Negative for nausea and vomiting. Endocrine: Negative. Genitourinary: Negative. Musculoskeletal: Negative for neck pain and neck stiffness. Skin: Negative for rash. Neurological: Negative for dizziness and headaches.           PHYSICAL EXAM  Temp 97.9 °F (36.6 °C)   Ht 5' 5\" (1.651 m)   Wt 157 lb (71.2 kg)   BMI 26.13 kg/m²     GENERAL: No Acute Distress, Alert and Oriented, no hoarseness  EYES: EOMI, Anti-icteric  NOSE: No epistaxis, nasal mucosa within normal limits, no purulent drainage, 3+ deviated septum to the left  EARS: Normal external canal appearance, EAC patent bilaterally, TMs intact bilaterally with no evidence of effusion  FACE: 1/6 House-Brackmann Scale, symmetric, sensation equal bilaterally  ORAL CAVITY: No masses or lesions palpated, uvula is midline, moist mucous membranes  NECK: Normal range of motion, no thyromegaly, trachea is midline, no lymphadenopathy, no neck masses, no crepitus  CHEST: Normal respiratory effort, no retractions, breathing comfortably  SKIN: No rashes, normal appearing skin, no evidence of skin lesions/tumors    RADIOLOGY  Summary of findings:      PROCEDURE      ASSESSMENT/PLAN  hCasidy Chand is a very pleasant 56 y.o. male with     1. Allergic rhinitis, unspecified seasonality, unspecified trigger  I will add Astelin to his nasal allergy regimen. We will see how this does. - azelastine (ASTELIN) 0.1 % nasal spray; 1 spray by Nasal route 2 times daily Use in each nostril as directed  Dispense: 30 mL; Refill: 3    2. Tinnitus of both ears  The ringing in his ear is not significantly bothersome to his and was to have a hearing test at this point time    3. Neck mass  His neck mass is likely of the thyroid cartilage. This is a normal anatomic landmark and is just prominent on him but does not feel worrisome for me at this point time. We will follow-up in 3 to 4 months to check on his allergies and will reassess the neck mass at that point time    Medical Decision Making:   The following items were considered in medical decision making:  Independent review of images  Review / order clinical lab tests  Review / order radiology tests  Decision to obtain old records

## 2022-05-05 ASSESSMENT — ENCOUNTER SYMPTOMS
COUGH: 0
VOMITING: 0
EYE PAIN: 0
NAUSEA: 0
SHORTNESS OF BREATH: 0

## 2022-05-05 NOTE — PROGRESS NOTES
[facility]      Patient Name: Yissel Chatterjee  Medical Record Number:  [id number]  Primary Care Physician:  Wyatt Orta DO  Date of Consultation: 5/6/2022    Chief Complaint:   Chief Complaint   Patient presents with    Follow-up     Mostly here for follow up on allergies        [facility] OF [place] [address]  Shun Bah is a(n) 56 y.o. male who presents evaluation of a lump on his neck. He states is been there for many years and has not changed. His franco noticed it and he is concerned and would like it checked out. He also has seasonal allergies and has used Flonase and over-the-counter allergy medications with some relief. He was on allergy shots for a long period in his life. He does not have any sinus infections that he complains of. He also has a low ringing in his ears. He has not had a recent hearing test but is not interested in hearing test this point time. Interval History 5/6/2022  He has been doing well with his allergies after adding Astelin. He is not overly concerned with his tinnitus.     Patient Active Problem List   Diagnosis    Seasonal allergic rhinitis due to pollen    Psoriasis    Anxiety and depression    Tinnitus of both ears    Chronic right shoulder pain    Right lateral epicondylitis     Past Surgical History:   Procedure Laterality Date    APPENDECTOMY      COLONOSCOPY  2007    COLONOSCOPY  08/25/2017   Oscar Vickers MOHS SURGERY  2009    VASECTOMY      WISDOM TOOTH EXTRACTION       Family History   Problem Relation Age of Onset    Cancer Father         brain    Heart Disease Father     Arrhythmia Father     Other Maternal Uncle         schizophrenia    Other Maternal Grandfather         schizophrenia    Colon Cancer Mother      Social History     Socioeconomic History    Marital status:      Spouse name: Not on file    Number of children: Not on file    Years of education: Not on file    Highest education level: Not on file   Occupational History    Not on file   Tobacco Use    Smoking status: Never Smoker    Smokeless tobacco: Never Used   Vaping Use    Vaping Use: Never used   Substance and Sexual Activity    Alcohol use: No    Drug use: No    Sexual activity: Never   Other Topics Concern    Not on file   Social History Narrative    Not on file     Social Determinants of Health     Financial Resource Strain:     Difficulty of Paying Living Expenses: Not on file   Food Insecurity:     Worried About Running Out of Food in the Last Year: Not on file    Mayank of Food in the Last Year: Not on file   Transportation Needs:     Lack of Transportation (Medical): Not on file    Lack of Transportation (Non-Medical): Not on file   Physical Activity:     Days of Exercise per Week: Not on file    Minutes of Exercise per Session: Not on file   Stress:     Feeling of Stress : Not on file   Social Connections:     Frequency of Communication with Friends and Family: Not on file    Frequency of Social Gatherings with Friends and Family: Not on file    Attends Mormonism Services: Not on file    Active Member of [address] [place] [address] or Organizations: Not on file    Attends Club or Organization Meetings: Not on file    Marital Status: Not on file   Intimate Partner Violence:     Fear of Current or Ex-Partner: Not on file    Emotionally Abused: Not on file    Physically Abused: Not on file    Sexually Abused: Not on file   Housing Stability:     Unable to Pay for Housing in the Last Year: Not on file    Number of [place] in the Last Year: Not on file    Unstable Housing in the Last Year: Not on file       DRUG/FOOD ALLERGIES: Patient has no known allergies. CURRENT MEDICATIONS  Prior to Admission medications    Medication Sig Start Date End Date Taking?  Authorizing Provider   azelastine (ASTELIN) 0.1 % nasal spray 1 spray by Nasal route 2 times daily Use in each nostril as directed 2/18/22  Yes [employer], DO L-Methylfolate-Algae (DEPLIN 15 PO) Take by mouth   Yes Historical Provider, MD   fluticasone (FLONASE) 50 MCG/ACT nasal spray 1 spray by Nasal route daily  Patient taking differently: 1 spray by Nasal route as needed  5/22/17  Yes Emily Whaley MD   fluticasone (FLONASE) 50 MCG/ACT nasal spray 1 spray by Nasal route daily  Patient taking differently: 1 spray by Nasal route as needed  5/22/17  Yes Emily Whaley MD   DULoxetine (CYMBALTA) 60 MG extended release capsule Take 60 mg by mouth daily   Yes Historical Provider, MD   econazole nitrate 1 % cream Apply topically daily Apply topically daily. Yes Historical Provider, MD   Hydrocortisone Butyrate 0.1 % CREA Apply topically daily    Yes Historical Provider, MD   naphazoline-pheniramine (NAPHCON-A) 0.025-0.3 % ophthalmic solution Place 1 drop into both eyes as needed    Yes Historical Provider, MD   cetirizine (ZYRTEC) 10 MG tablet Take 10 mg by mouth daily   Yes Historical Provider, MD   Multiple Vitamins-Minerals (THERAPEUTIC MULTIVITAMIN-MINERALS) tablet Take 1 tablet by mouth daily   Yes Historical Provider, MD   Potassium 99 MG TABS Take by mouth daily    Yes Historical Provider, MD       REVIEW OF SYSTEMS  The following systems were reviewed and revealed the following in addition to any already discussed in the HPI:    Review of Systems   Constitutional: Negative for fatigue and fever. HENT: Positive for postnasal drip, rhinorrhea and tinnitus. Negative for congestion, ear pain and sneezing. Eyes: Negative for pain and visual disturbance. Respiratory: Negative for cough and shortness of breath. Cardiovascular: Negative for chest pain. Gastrointestinal: Negative for nausea and vomiting. Endocrine: Negative. Genitourinary: Negative. Musculoskeletal: Negative for neck pain and neck stiffness. Skin: Negative for rash. Neurological: Negative for dizziness and headaches.           PHYSICAL EXAM  Temp 98.1 °F (36.7 °C) (Infrared) Resp 16   Ht 5' 5\" (1.651 m)   Wt 157 lb (71.2 kg)   BMI 26.13 kg/m²     GENERAL: No Acute Distress, Alert and Oriented, no hoarseness  EYES: EOMI, Anti-icteric  NOSE: No epistaxis, nasal mucosa within normal limits, no purulent drainage, 3+ deviated septum to the left  EARS: Normal external canal appearance, EAC patent bilaterally, TMs intact bilaterally with no evidence of effusion  FACE: 1/6 House-Brackmann Scale, symmetric, sensation equal bilaterally  ORAL CAVITY: No masses or lesions palpated, uvula is midline, moist mucous membranes  NECK: Normal range of motion, no thyromegaly, trachea is midline, no lymphadenopathy, no neck masses, no crepitus  CHEST: Normal respiratory effort, no retractions, breathing comfortably  SKIN: No rashes, normal appearing skin, no evidence of skin lesions/tumors    RADIOLOGY  Summary of findings:      PROCEDURE      ASSESSMENT/PLAN  Claudia Garcia is a very pleasant 56 y.o. male with     1. Seasonal allergies  He will continue using his Astelin as it is helpful. If he needs further help with his allergies he will call and we will consider adding Singulair or retesting his allergies. 2. Tinnitus of both ears  We will continue to observe this. He will follow-up in 6 to 7 months. Medical Decision Making:   The following items were considered in medical decision making:  Independent review of images  Review / order clinical lab tests  Review / order radiology tests  Decision to obtain old records

## 2022-05-06 ENCOUNTER — OFFICE VISIT (OUTPATIENT)
Dept: ENT CLINIC | Age: 57
End: 2022-05-06
Payer: COMMERCIAL

## 2022-05-06 VITALS — WEIGHT: 157 LBS | HEIGHT: 65 IN | BODY MASS INDEX: 26.16 KG/M2 | RESPIRATION RATE: 16 BRPM | TEMPERATURE: 98.1 F

## 2022-05-06 DIAGNOSIS — H93.13 TINNITUS OF BOTH EARS: ICD-10-CM

## 2022-05-06 DIAGNOSIS — J30.2 SEASONAL ALLERGIES: Primary | ICD-10-CM

## 2022-05-06 PROCEDURE — 99213 OFFICE O/P EST LOW 20 MIN: CPT | Performed by: STUDENT IN AN ORGANIZED HEALTH CARE EDUCATION/TRAINING PROGRAM

## 2022-05-06 ASSESSMENT — ENCOUNTER SYMPTOMS: RHINORRHEA: 1

## 2022-06-10 ENCOUNTER — NURSE TRIAGE (OUTPATIENT)
Dept: OTHER | Facility: CLINIC | Age: 57
End: 2022-06-10

## 2022-06-10 NOTE — TELEPHONE ENCOUNTER
Received call from Moon at [facility] with Red Flag Complaint. Subjective: Caller states \"I have had a cough and congestion\"     Current Symptoms: Cough and congestion. Started with sinus congestion and drainage. Sinus drainage is getting better. Coughing up white phlegm. Feels like there is phlegm in my chest, causing it to feel tight. Decreased energy. No SOB at rest, only with activity. Chills and sweats, more at night. Blood tinged nasal drainage when blowing nose in AM.  Blood tinged sputum. Onset: 10 days ago; improving    Associated Symptoms: NA    Pain Severity: 0/10; N/A; none    Temperature: denies      What has been tried: Mucinex-helps some    LMP: NA Pregnant: NA    Recommended disposition: Go to [facility]    Care advice provided, patient verbalizes understanding; denies any other questions or concerns; instructed to call back for any new or worsening symptoms. Patient/Caller agrees with recommended disposition; writer provided warm transfer to [facility] at [facility] for appointment scheduling     Attention Provider: Thank you for allowing me to participate in the care of your patient. The patient was connected to triage in response to information provided to the ECC/PSC. Please do not respond through this encounter as the response is not directed to a shared pool.     Reason for Disposition   MILD difficulty breathing (e.g., minimal/no SOB at rest, SOB with walking, pulse <100) and still present when not coughing    Protocols used: COUGH-ADULT-OH

## 2022-08-16 ENCOUNTER — OFFICE VISIT (OUTPATIENT)
Dept: FAMILY MEDICINE CLINIC | Age: 57
End: 2022-08-16
Payer: COMMERCIAL

## 2022-08-16 VITALS
WEIGHT: 144.6 LBS | SYSTOLIC BLOOD PRESSURE: 92 MMHG | OXYGEN SATURATION: 97 % | DIASTOLIC BLOOD PRESSURE: 70 MMHG | HEART RATE: 70 BPM | BODY MASS INDEX: 24.06 KG/M2

## 2022-08-16 DIAGNOSIS — Z00.00 ENCOUNTER FOR ANNUAL PHYSICAL EXAM: Primary | ICD-10-CM

## 2022-08-16 DIAGNOSIS — F41.9 ANXIETY AND DEPRESSION: ICD-10-CM

## 2022-08-16 DIAGNOSIS — M75.41 SHOULDER IMPINGEMENT, RIGHT: ICD-10-CM

## 2022-08-16 DIAGNOSIS — F32.A ANXIETY AND DEPRESSION: ICD-10-CM

## 2022-08-16 PROCEDURE — 99396 PREV VISIT EST AGE 40-64: CPT | Performed by: STUDENT IN AN ORGANIZED HEALTH CARE EDUCATION/TRAINING PROGRAM

## 2022-08-16 PROCEDURE — 99213 OFFICE O/P EST LOW 20 MIN: CPT | Performed by: STUDENT IN AN ORGANIZED HEALTH CARE EDUCATION/TRAINING PROGRAM

## 2022-08-16 RX ORDER — DULOXETIN HYDROCHLORIDE 60 MG/1
60 CAPSULE, DELAYED RELEASE ORAL DAILY
Qty: 90 CAPSULE | Refills: 1 | Status: SHIPPED | OUTPATIENT
Start: 2022-08-16 | End: 2022-09-19 | Stop reason: SDUPTHER

## 2022-08-16 RX ORDER — LEVOMEFOLATE CALCIUM 15 MG
1 TABLET ORAL DAILY
Qty: 90 TABLET | Refills: 1 | Status: SHIPPED | OUTPATIENT
Start: 2022-08-16

## 2022-08-16 RX ORDER — LAMOTRIGINE 100 MG/1
100 TABLET ORAL DAILY
Qty: 90 TABLET | Refills: 3 | Status: SHIPPED | OUTPATIENT
Start: 2022-08-16 | End: 2022-09-19 | Stop reason: SDUPTHER

## 2022-08-16 ASSESSMENT — ANXIETY QUESTIONNAIRES
1. FEELING NERVOUS, ANXIOUS, OR ON EDGE: 1
IF YOU CHECKED OFF ANY PROBLEMS ON THIS QUESTIONNAIRE, HOW DIFFICULT HAVE THESE PROBLEMS MADE IT FOR YOU TO DO YOUR WORK, TAKE CARE OF THINGS AT HOME, OR GET ALONG WITH OTHER PEOPLE: NOT DIFFICULT AT ALL
5. BEING SO RESTLESS THAT IT IS HARD TO SIT STILL: 1
3. WORRYING TOO MUCH ABOUT DIFFERENT THINGS: 1
7. FEELING AFRAID AS IF SOMETHING AWFUL MIGHT HAPPEN: 0
2. NOT BEING ABLE TO STOP OR CONTROL WORRYING: 0
GAD7 TOTAL SCORE: 4
4. TROUBLE RELAXING: 0
6. BECOMING EASILY ANNOYED OR IRRITABLE: 1

## 2022-08-16 ASSESSMENT — PATIENT HEALTH QUESTIONNAIRE - PHQ9
3. TROUBLE FALLING OR STAYING ASLEEP: 1
6. FEELING BAD ABOUT YOURSELF - OR THAT YOU ARE A FAILURE OR HAVE LET YOURSELF OR YOUR FAMILY DOWN: 1
2. FEELING DOWN, DEPRESSED OR HOPELESS: 1
1. LITTLE INTEREST OR PLEASURE IN DOING THINGS: 0
SUM OF ALL RESPONSES TO PHQ9 QUESTIONS 1 & 2: 1
SUM OF ALL RESPONSES TO PHQ QUESTIONS 1-9: 7
7. TROUBLE CONCENTRATING ON THINGS, SUCH AS READING THE NEWSPAPER OR WATCHING TELEVISION: 1
10. IF YOU CHECKED OFF ANY PROBLEMS, HOW DIFFICULT HAVE THESE PROBLEMS MADE IT FOR YOU TO DO YOUR WORK, TAKE CARE OF THINGS AT HOME, OR GET ALONG WITH OTHER PEOPLE: 0
5. POOR APPETITE OR OVEREATING: 1
4. FEELING TIRED OR HAVING LITTLE ENERGY: 1
9. THOUGHTS THAT YOU WOULD BE BETTER OFF DEAD, OR OF HURTING YOURSELF: 0
8. MOVING OR SPEAKING SO SLOWLY THAT OTHER PEOPLE COULD HAVE NOTICED. OR THE OPPOSITE, BEING SO FIGETY OR RESTLESS THAT YOU HAVE BEEN MOVING AROUND A LOT MORE THAN USUAL: 1
SUM OF ALL RESPONSES TO PHQ QUESTIONS 1-9: 7

## 2022-08-16 NOTE — PROGRESS NOTES
Rosibel Justice  YOB: 1965    Date of Service:  8/16/2022    Chief Complaint:   Rosibel Justice is a 56 y.o. male who presents for a preventative visit    HPI:    Annual physical    Concerns:     Due for [facility] physical    Previous Colonoscopy: yes - 2017. Due this year for 5 year repeat. Previously done by Dr. Hammad Hummel. Will check if repeat or referral to new provider. Exercise: Brisk walk seeral nights a week. Diet: Mixed diet of healthy foods and fast food. Anxiety and Depression  Followed with Psych previously  Cymbalta 60mg daily  lamotrigene 100mg daily  Has tried weaning in the past and had recurence of symptoms  Has just recently  from his wife and plans to reestablish with counseling. Feels he is managing emotions as well as expected right now. Reports that right shoulder is bothering him and would like a referral to orthopedics.      Wt Readings from Last 3 Encounters:   08/16/22 144 lb 9.6 oz (65.6 kg)   05/06/22 157 lb (71.2 kg)   02/18/22 157 lb (71.2 kg)     BP Readings from Last 3 Encounters:   08/16/22 92/70   02/14/22 122/78   02/26/21 118/60       Patient Active Problem List   Diagnosis    Seasonal allergic rhinitis due to pollen    Psoriasis    Anxiety and depression    Tinnitus of both ears    Chronic right shoulder pain    Right lateral epicondylitis       Health Maintenance   Topic Date Due    Shingles vaccine (1 of 2) Never done    COVID-19 Vaccine (2 - Moderna series) 03/05/2021    Colorectal Cancer Screen  08/25/2022    Flu vaccine (1) 09/01/2022    Depression Monitoring  08/16/2023    Lipids  03/02/2026    DTaP/Tdap/Td vaccine (2 - Td or Tdap) 05/22/2027    Hepatitis C screen  Completed    HIV screen  Completed    Hepatitis A vaccine  Aged Out    Hepatitis B vaccine  Aged Out    Hib vaccine  Aged Out    Meningococcal (ACWY) vaccine  Aged Out    Pneumococcal 0-64 years Vaccine  Aged [employer] History   Administered Date(s) Administered COVID-19, MODERNA BLUE border, Primary or Immunocompromised, (age 12y+), IM, 100 mcg/0.5mL 02/05/2021    Influenza A (S5P3-61) Vaccine PF IM 12/30/2009    Influenza Vaccine, unspecified formulation 10/01/2016    Influenza Virus Vaccine 10/25/2017    Influenza, AFLURIA, Florance Solo (age 1 y+), MDV 10/07/2020    Influenza, FLUARIX, FLULAVAL, (age 10 mo+) AND AFLURIA, FLUZONE (age 1 y+), PF 10/07/2020    Influenza, FLUBLOK, (age 25 y+), PF 11/23/2021    Tdap (Boostrix, Adacel) 05/22/2017       No Known Allergies  Current Outpatient Medications   Medication Sig Dispense Refill    DULoxetine (CYMBALTA) 60 MG extended release capsule Take 1 capsule by mouth in the morning. 90 capsule 1    lamoTRIgine (LAMICTAL) 100 MG tablet Take 1 tablet by mouth in the morning. 90 tablet 3    L-Methylfolate 15 MG TABS Take 1 tablet by mouth daily 90 tablet 1    azelastine (ASTELIN) 0.1 % nasal spray 1 spray by Nasal route 2 times daily Use in each nostril as directed 30 mL 3    L-Methylfolate-Algae (DEPLIN 15 PO) Take by mouth      fluticasone (FLONASE) 50 MCG/ACT nasal spray 1 spray by Nasal route daily (Patient taking differently: 1 spray by Nasal route as needed) 1 Bottle 5    econazole nitrate 1 % cream Apply topically daily Apply topically daily. Hydrocortisone Butyrate 0.1 % CREA Apply topically daily       naphazoline-pheniramine (NAPHCON-A) 0.025-0.3 % ophthalmic solution Place 1 drop into both eyes as needed       cetirizine (ZYRTEC) 10 MG tablet Take 10 mg by mouth daily      Multiple Vitamins-Minerals (THERAPEUTIC MULTIVITAMIN-MINERALS) tablet Take 1 tablet by mouth daily (Patient not taking: Reported on 8/16/2022)      Potassium 99 MG TABS Take by mouth daily  (Patient not taking: Reported on 8/16/2022)       No current facility-administered medications for this visit.        Past Medical History:   Diagnosis Date    Allergic rhinitis     Cancer (HCC)     basal cell skin    PONV (postoperative nausea and vomiting) Psoriasis      Past Surgical History:   Procedure Laterality Date    APPENDECTOMY      COLONOSCOPY  2007    COL[facility]  08/25/2017    [facility]    VASECTOMY      WISDOM TOOTH EXTRACTION       Family History   Problem Relation Age of Onset    Cancer Father         brain    Heart Disease Father     Arrhythmia Father     Other Maternal Uncle         schizophrenia    Other Maternal Grandfather         schizophrenia    Colon Cancer Mother      Social History     Socioeconomic History    Marital status:      Spouse name: Not on file    Number of children: Not on file    Years of education: Not on file    Highest education level: Not on file   Occupational History    Not on file   Tobacco Use    Smoking status: Never    Smokeless tobacco: Never   Vaping Use    Vaping Use: Never used   Substance and Sexual Activity    Alcohol use: No    Drug use: No    Sexual activity: Never   Other Topics Concern    Not on file   Social History Narrative    Not on file     Social Determinants of Health     Financial Resource Strain: Not on file   Food Insecurity: Not on file   Transportation Needs: Not on file   Physical Activity: Not on file   Stress: Not on file   Social Connections: Not on file   Intimate Partner Violence: Not on file   Housing Stability: Not on file       Review of Systems:  Review of Systems    Physical Exam:   Vitals:    08/16/22 1335   BP: 92/70   Site: Right Upper Arm   Position: Sitting   Cuff Size: Medium Adult   Pulse: 70   SpO2: 97%   Weight: 144 lb 9.6 oz (65.6 kg)     Body mass index is 24.06 kg/m². Physical Exam  Vitals reviewed. Constitutional:       Appearance: Normal appearance. HENT:      Head: Normocephalic and atraumatic. Cardiovascular:      Rate and Rhythm: Normal rate and regular rhythm. Pulmonary:      Effort: Pulmonary effort is normal.      Breath sounds: Normal breath sounds. Neurological:      General: No focal deficit present.       Mental Status: He is alert and oriented to person, place, and time. Psychiatric:         Behavior: Behavior normal.         Thought Content: Thought content normal.       Assessment/Plan:  1. Encounter for annual physical exam  Reviewed routine health recommendations  - Comprehensive Metabolic Panel; Future  - Lipid Panel; Future    2. Anxiety and depression  Will continue current medications. Would hold on weaning for now given recent separation. Will call if worsening symptoms.   - DULoxetine (CYMBALTA) 60 MG extended release capsule; Take 1 capsule by mouth in the morning. Dispense: 90 capsule; Refill: 1  - lamoTRIgine (LAMICTAL) 100 MG tablet; Take 1 tablet by mouth in the morning. Dispense: 90 tablet; Refill: 3  - L-Methylfolate 15 MG TABS; Take 1 tablet by mouth daily  Dispense: 90 tablet; Refill: 1    3. Shoulder impingement, right  History of shoulder impingement. Exam consistent. Will refer for PT.   - [address]    While assessing care for this patient, I have reviewed all pertinent lab work/imaging/ specialist notes and care in reference to those problems addressed above in detail. Appropriate medical decision making was based on this. Please note that portions of this note may have been completed with a voice recognition program. Efforts were made to edit the dictations but occasionally words are mis-transcribed. No follow-ups on file.       8/17/22

## 2022-08-16 NOTE — PROGRESS NOTES
Patient: Nadir Johnson is a 56 y.o. male who presents today with the following Chief Complaint(s):  Chief Complaint   Patient presents with    6 Month Follow-Up         HPI    Anxiety and Depression  Follows with Psych previously  Cymbalta 60mg daily  lamotrigene 100mg daily  Has tried weaning in the past and had recurence of symptoms  Talking with therapist every 6 months  Feels he is in remission       Psoriasis   Rectum, penis, and fingernails  Stable on cream medications  Symptom free for last few months  Following with derm     Had basal cell removed from nasal bridge 12/8/21       Current Outpatient Medications   Medication Sig Dispense Refill    azelastine (ASTELIN) 0.1 % nasal spray 1 spray by Nasal route 2 times daily Use in each nostril as directed 30 mL 3    fluticasone (FLONASE) 50 MCG/ACT nasal spray 1 spray by Nasal route daily (Patient taking differently: 1 spray by Nasal route as needed) 1 Bottle 5    fluticasone (FLONASE) 50 MCG/ACT nasal spray 1 spray by Nasal route daily (Patient taking differently: 1 spray by Nasal route as needed) 3 Bottle 3    DULoxetine (CYMBALTA) 60 MG extended release capsule Take 60 mg by mouth daily      econazole nitrate 1 % cream Apply topically daily Apply topically daily. Hydrocortisone Butyrate 0.1 % CREA Apply topically daily       naphazoline-pheniramine (NAPHCON-A) 0.025-0.3 % ophthalmic solution Place 1 drop into both eyes as needed       cetirizine (ZYRTEC) 10 MG tablet Take 10 mg by mouth daily      L-Methylfolate-Algae (DEPLIN 15 PO) Take by mouth      Multiple Vitamins-Minerals (THERAPEUTIC MULTIVITAMIN-MINERALS) tablet Take 1 tablet by mouth daily (Patient not taking: Reported on 8/16/2022)      Potassium 99 MG TABS Take by mouth daily  (Patient not taking: Reported on 8/16/2022)       No current facility-administered medications for this visit.        Patient's past medical history, surgical history, family history, medications,  andallergies  were all reviewed and updated as appropriate today. Review of Systems  All other systems reviewed and negative    Physical Exam  There were no vitals filed for this visit. Assessment:  No diagnosis found. Plan:  There are no diagnoses linked to this encounter. No follow-ups on file.

## 2022-08-29 ENCOUNTER — HOSPITAL ENCOUNTER (OUTPATIENT)
Dept: PHYSICAL THERAPY | Age: 57
Setting detail: THERAPIES SERIES
Discharge: HOME | End: 2022-08-29
Payer: COMMERCIAL

## 2022-08-29 NOTE — FLOWSHEET NOTE
[facility], [address], [address] [facility]        Physical Therapy  Cancellation/No-show Note  Patient Name:  Brunilda Carlisle  :  1965   Date:  2022  Cancelled visits to date: 1  No-shows to date: 0    For today's appointment patient:  [x]  Cancelled  []  Rescheduled appointment  []  No-show     Reason given by patient:  [x]  Patient ill  []  Conflicting appointment  []  No transportation    []  Conflict with work  []  No reason given  []  Other:     Comments:  Recovering from [facility]. Scheduled for 22.     Electronically signed by:  Suki Duncan, [facility]

## 2022-08-31 ENCOUNTER — HOSPITAL ENCOUNTER (OUTPATIENT)
Dept: PHYSICAL THERAPY | Age: 57
Setting detail: THERAPIES SERIES
Discharge: HOME | End: 2022-08-31
Payer: COMMERCIAL

## 2022-08-31 PROCEDURE — 97161 PT EVAL LOW COMPLEX 20 MIN: CPT | Performed by: PHYSICAL THERAPIST

## 2022-08-31 NOTE — FLOWSHEET NOTE
[facility],  [address]  Phone: [phone]  Fax [phone]        Date:  2022    Patient Name:  January Kwon    :  1965  MRN: [id number]  Restrictions/Precautions:  Spondylolisthesis lumbar spine   Medical/Treatment Diagnosis Information:  Diagnosis: M75.41 (ICD-10-CM) - Shoulder impingement, right  Treatment Diagnosis: M75.41 (ICD-10-CM) - Shoulder impingement, right  Insurance/Certification information:  PT Insurance Information: [place]/30PT/No auth  Physician Information:  Ladarius Reyes   Has the plan of care been signed (Y/N):        []  Yes  [x]  No     Date of Patient follow up with Physician: none scheduled      Is this a Progress Report:     []  Yes  [x]  No        If Yes:  Date Range for reporting period:  Beginning22  Ending    Progress report will be due (10 Rx or 30 days whichever is less):        Recertification will be due (POC Duration  / 90 days whichever is less): 12 weeks      Visit # Insurance Allowable Auth Required   In-person 1 30 PT []  Yes [x]  No    Telehealth   []  Yes []  No    Total            Functional Scale: FOTO; 59/100    Date assessed:  22      Therapy Diagnosis/Practice Pattern:4E. Impaired joint mobility, motor function, muscle performance,  and ROM associated with localized inflammation. Number of Comorbidities:  []0     [x]1-2    []3+    Latex Allergy:  [x]NO      []YES  Preferred Language for Healthcare:   [x]English       []other:      Pain level:  3-5/10     SUBJECTIVE:  See eval    OBJECTIVE: See eval  Observation:   Test measurements:      RESTRICTIONS/PRECAUTIONS: Spondylolisthesis lumbar spine.  Skin CA history     Exercises/Interventions:     Therapeutic Ex (18337) Sets/sec Reps Notes/CUES   Patient given HEP to start   See below                                                                     Manual Intervention (.[id number]) PROm/mobs  N.V                             NMR re-education (73905)   CUES NEEDED         Patient education regarding prognosis/posture correction and benefits of exercise 5'                                               Therapeutic Activity (02930)                                          HEP instruction: (see scanned forms)  Access Code: [email or web address]  URL: [email or web address]  Date: 08/31/2022  Prepared by: Suki Perish     Exercises  Standing Shoulder Row with Anchored Resistance - 2 x daily - 7 x weekly - 2-3 sets - 10 reps - 5 hold  Shoulder extension with resistance - Neutral - 2 x daily - 7 x weekly - 2-3 sets - 10 reps - 5 hold  Shoulder External Rotation and Scapular Retraction with Resistance - 2 x daily - 7 x weekly - 2-3 sets - 10 reps - 5 hold  Corner Pec Major Stretch - 2 x daily - 7 x weekly - 1 sets - 5 reps - 30\" hold         Therapeutic Exercise and NMR EXR  [x] (86994) Provided verbal/tactile cueing for activities related to strengthening, flexibility, endurance, ROM  for improvements in scapular, scapulothoracic and UE control with self care, reaching, carrying, lifting, house/yardwork, driving/computer work. [x] (26572) Provided verbal/tactile cueing for activities related to improving balance, coordination, kinesthetic sense, posture, motor skill, proprioception  to assist with  scapular, scapulothoracic and UE control with self care, reaching, carrying, lifting, house/yardwork, driving/computer work. Therapeutic Activities:    [] (15965 or 69043) Provided verbal/tactile cueing for activities related to improving balance, coordination, kinesthetic sense, posture, motor skill, proprioception and motor activation to allow for proper function of scapular, scapulothoracic and UE control with self care, carrying, lifting, driving/computer work.      Home Exercise Program:    [x] (23191) Reviewed/Progressed HEP activities related to strengthening, flexibility, endurance, ROM of scapular, scapulothoracic and UE control with self care, reaching, carrying, lifting, house/yardwork, driving/computer work  [] (36820) Reviewed/Progressed HEP activities related to improving balance, coordination, kinesthetic sense, posture, motor skill, proprioception of scapular, scapulothoracic and UE control with self care, reaching, carrying, lifting, house/yardwork, driving/computer work      Manual Treatments:  PROM / STM / Oscillations-Mobs:  G-I, II, III, IV (PA's, Inf., Post.)  [x] (57258) Provided manual therapy to mobilize soft tissue/joints of cervical/CT, scapular GHJ and UE for the purpose of modulating pain, promoting relaxation,  increasing ROM, reducing/eliminating soft tissue swelling/inflammation/restriction, improving soft tissue extensibility and allowing for proper ROM for normal function with self care, reaching, carrying, lifting, house/yardwork, driving/computer work    Modalities:  Deferred    Charges  Timed Code Treatment Minutes: 30'   Total Treatment Minutes: 30'           [x] EVAL (LOW) 40403   [] EVAL (MOD) 89887   [] EVAL (HIGH) 64830   [] RE-EVAL     [] DA(54577) x     [] IONTO  [] NMR (78484) x     [] VASO  [] Manual (58417) x      [] Other:  [] TA x      [] Mech Traction (99674)  [] ES(attended) (06713)      [] ES (un) (43817):       GOALS:  Patient stated goal: painfree reaching  [] Progressing: [] Met: [] Not Met: [] Adjusted     Therapist goals for Patient:   Short Term Goals: To be achieved in: 2 weeks  1. Independent in HEP and progression per patient tolerance, in order to prevent re-injury. [] Progressing: [] Met: [] Not Met: [] Adjusted  2. Patient will have a decrease in pain to facilitate improvement in movement, function, and ADLs as indicated by Functional Deficits. [] Progressing: [] Met: [] Not Met: [] Adjusted     Long Term Goals: To be achieved in: 12 weeks  1. Disability index score of 72% or more per FOTO to assist with reaching prior level of function.    [] Progressing: [] Met: [] Not Met: [] Adjusted  2. Patient will demonstrate increased AROM to WNL painfree all shoulder motions to allow for proper joint functioning as indicated by patients Functional Deficits. [] Progressing: [] Met: [] Not Met: [] Adjusted  3. Patient will demonstrate an increase in Strength to 4+/5 R RC strength to allow for proper functional mobility as indicated by patients Functional Deficits. [] Progressing: [] Met: [] Not Met: [] Adjusted  4. Patient will return to overhead reaching functional activities without increased symptoms or restriction. [] Progressing: [] Met: [] Not Met: [] Adjusted  5. Patient to be able to reach behind back without pain(patient specific functional goal)    [] Progressing: [] Met: [] Not Met: [] Adjusted             Progression Towards Functional goals:  [] Patient is progressing as expected towards functional goals listed. [] Progression is slowed due to complexities listed. [] Progression has been slowed due to co-morbidities. [x] Plan just implemented, too soon to assess goals progression  [] Other:     ASSESSMENT:  See eval    Overall Progression Towards Functional goals/ Treatment Progress Update:  [] Patient is progressing as expected towards functional goals listed. [] Progression is slowed due to complexities/Impairments listed. [] Progression has been slowed due to co-morbidities.   [x] Plan just implemented, too soon to assess goals progression <30days   [] Goals require adjustment due to lack of progress  [] Patient is not progressing as expected and requires additional follow up with physician  [] Other    Prognosis for POC: [x] Good [] Fair  [] Poor      Patient requires continued skilled intervention: [x] Yes  [] No    Treatment/Activity Tolerance:  [x] Patient able to complete treatment  [] Patient limited by fatigue  [] Patient limited by pain    [] Patient limited by other medical complications  [] Other:         Return to Play: (if applicable)   []  Stage 1: Intro to Strength   []  Stage 2: Return to Run and Strength   []  Stage 3: Return to Jump and Strength   []  Stage 4: Dynamic Strength and Agility   []  Stage 5: Sport Specific Training     []  Ready to Return to Play, Meets All Above Stages   []  Not Ready for Return to Sports   Comments:                               PLAN: See eval. Progress scapular stabilization N.V. + RC strengthening  [] Continue per plan of care [] Alter current plan (see comments above)  [x] Plan of care initiated [] Hold pending MD visit [] Discharge      Electronically signed by:  Debby Santos, [address]    Note: If patient does not return for scheduled/ recommended follow up visits, this note will serve as a discharge from care along with most recent update on progress.

## 2022-08-31 NOTE — PLAN OF CARE
[facility], [address]  Phone: [phone]  Fax [phone]     Physical Therapy Certification    Dear Alejandra Moran ,    We had the pleasure of evaluating the following patient for physical therapy services at [address] [place] [address]. A summary of our findings can be found in the initial assessment below. This includes our plan of care. If you have any questions or concerns regarding these findings, please do not hesitate to contact me at the office phone number checked above. Thank you for the referral.       Physician Signature:_______________________________Date:__________________  By signing above (or electronic signature), therapists plan is approved by physician    Patient: Shaggy Jimenez   : 1965   MRN: [id number]  Referring Physician:  João Whyte DO ,      Evaluation Date: 2022      Medical Diagnosis Information:  Diagnosis: M75.41 (ICD-10-CM) - Shoulder impingement, right   Treatment Diagnosis: M75.41 (ICD-10-CM) - Shoulder impingement, right                                         Insurance information: PT Insurance Information: [place]/30PT/No auth    Precautions/ Contra-indications:   C-SSRS Triggered by Intake questionnaire (Past 2 wk assessment):   [x] No, Questionnaire did not trigger screening.   [] Yes, Patient intake triggered further evaluation      [] C-SSRS Screening completed  [] PCP notified via Plan of Care  [] Emergency services notified     Latex Allergy:  [x]NO      []YES  Preferred Language for Healthcare:   [x]English       []other:    SUBJECTIVE: Patient stated complaint:Reports ongoing R shld pain for several years. Pain is located anterior shld and has intermittently radiates to elbow/wrist and middle finger. Previous injury to R shld when using a tiller a few years ago may have been onset of shld pain.     Relevant Medical History:skin CA, Has Congenital spondylolisthesis history. Seeing chiropractor. Functional Disability Index: FOTO: 59/100 Goal 72/100    Pain Scale: 3-5/10  Easing factors: rest,   Provocative factors: Overhead reaching, reaching acrossed and behind back, yard work and household activities. Type: [x]Constant   []Intermittent  []Radiating []Localized []other:     Numbness/Tingling: Denies N/T    Occupation/School: Full time IT    Living Status/Prior Level of Function: Independent with ADLs and IADLs, R hand dominant. Walking for exercise. OBJECTIVE:     CERV ROM     Cervical Flexion WNL WNL   Cervical Extension     Cervical SB     Cervical rotation WNL WNL        ROM Left Right   Shoulder Flex  p   Shoulder Abd WNL WNL   Shoulder ER WNL WNL   Shoulder IR T9 T10 p                  Strength  Left Right   Shoulder Flex 4 4- p   Shoulder Scap 4 4 p   Shoulder ER 4 4- p   Shoulder IR 4 4               Reflexes/Sensation:    [x]Dermatomes/Myotomes intact    [x]Reflexes equal and normal bilaterally   []Other:    Joint mobility: tight P-A glides   []Normal    [x]Hypo   []Hyper    Palpation: - [place] , -GT/LT, -AC    Functional Mobility/Transfers: IND    Posture: forward head /rounded shoulders. Tight pecs    Bandages/Dressings/Incisions:     Gait: (include devices/WB status): WNL    Orthopedic Special Tests: slight pain with impingment test, + Speeds, - Drop arm,                       [x] Patient history, allergies, meds reviewed. Medical chart reviewed. See intake form. Review Of Systems (ROS):  [x]Performed Review of systems (Integumentary, CardioPulmonary, Neurological) by intake and observation. Intake form has been scanned into medical record. Patient has been instructed to contact their primary care physician regarding ROS issues if not already being addressed at this time.       Co-morbidities/Complexities (which will affect course of rehabilitation):   []None           Arthritic conditions   []Rheumatoid positions   [x]Reduced ability to maintain good posture and demonstrate good body mechanics with sitting, bending, and lifting   [] Reduced ability or tolerance with driving and/or computer work   []Reduced ability to sleep   [x]Reduced ability to perform lifting, reaching, carrying tasks   []Reduced ability to tolerate impact through UE   [x]Reduced ability to reach behind back   []Reduced ability to  or hold objects   []Reduced ability to throw or toss an object   []other:    Participation Restrictions   []Reduced participation in self care activities   [x]Reduced participation in home management activities   []Reduced participation in work activities   []Reduced participation in social activities. []Reduced participation in sport/recreation activities. Classification:   []Signs/symptoms consistent with post-surgical status including decreased ROM, strength and function.   []Signs/symptoms consistent with joint sprain/strain   [x]Signs/symptoms consistent with shoulder impingement   []Signs/symptoms consistent with shoulder/elbow/wrist tendinopathy   []Signs/symptoms consistent with Rotator cuff tear   []Signs/symptoms consistent with labral tear   [x]Signs/symptoms consistent with postural dysfunction    []Signs/symptoms consistent with Glenohumeral IR Deficit - <45 degrees   []Signs/symptoms consistent with facet dysfunction of cervical/thoracic spine    []Signs/symptoms consistent with pathology which may benefit from Dry needling     []other:     Prognosis/Rehab Potential:      []Excellent   [x]Good    []Fair   []Poor    Tolerance of evaluation/treatment:    []Excellent   [x]Good    []Fair   []Poor  Physical Therapy Evaluation Complexity Justification  [x] A history of present problem with:  [] no personal factors and/or comorbidities that impact the plan of care;  [x]1-2 personal factors and/or comorbidities that impact the plan of care  []3 personal factors and/or comorbidities that impact the plan of care  [x] An examination of body systems using standardized tests and measures addressing any of the following: body structures and functions (impairments), activity limitations, and/or participation restrictions;:  [] a total of 1-2 or more elements   [x] a total of 3 or more elements   [] a total of 4 or more elements   [x] A clinical presentation with:  [x] stable and/or uncomplicated characteristics   [] evolving clinical presentation with changing characteristics  [] unstable and unpredictable characteristics;   [x] Clinical decision making of [x] low, [] moderate, [] high complexity using standardized patient assessment instrument and/or measurable assessment of functional outcome. [x] EVAL (LOW) 71409 (typically 20 minutes face-to-face)  [] EVAL (MOD) 26775 (typically 30 minutes face-to-face)  [] EVAL (HIGH) 72640 (typically 45 minutes face-to-face)  [] RE-EVAL       PLAN:  Frequency/Duration:  1-2 days per week for 12 Weeks:  INTERVENTIONS:  [x] Therapeutic exercise including: strength training, ROM, for Upper extremity and core   [x]  NMR activation and proprioception for UE, scap and Core   [x] Manual therapy as indicated for shoulder, scapula and spine to include: Dry Needling/IASTM, STM, PROM, Gr I-IV mobilizations, manipulation. [x] Modalities as needed that may include: thermal agents, E-stim, Biofeedback, US, iontophoresis as indicated  [] Patient education on joint protection, postural re-education, activity modification, progression of HEP. HEP instruction: (see scanned forms)  Access Code: [email or web address]  URL: [email or web address]/  Date: 08/31/2022  Prepared by: Koki Esparza    Exercises  Standing Shoulder Row with Anchored Resistance - 2 x daily - 7 x weekly - 2-3 sets - 10 reps - 5 hold  Shoulder extension with resistance - Neutral - 2 x daily - 7 x weekly - 2-3 sets - 10 reps - 5 hold  Shoulder External Rotation and Scapular Retraction with Resistance - 2 x daily - 7 x weekly - 2-3 sets - 10 reps - 5 hold  Corner Pec Major Stretch - 2 x daily - 7 x weekly - 1 sets - 5 reps - 30\" hold    GOALS:  Patient stated goal: painfree reaching  [] Progressing: [] Met: [] Not Met: [] Adjusted    Therapist goals for Patient:   Short Term Goals: To be achieved in: 2 weeks  1. Independent in HEP and progression per patient tolerance, in order to prevent re-injury. [] Progressing: [] Met: [] Not Met: [] Adjusted  2. Patient will have a decrease in pain to facilitate improvement in movement, function, and ADLs as indicated by Functional Deficits. [] Progressing: [] Met: [] Not Met: [] Adjusted    Long Term Goals: To be achieved in: 12 weeks  1. Disability index score of 72% or more per FOTO to assist with reaching prior level of function. [] Progressing: [] Met: [] Not Met: [] Adjusted  2. Patient will demonstrate increased AROM to WNL painfree all shoulder motions to allow for proper joint functioning as indicated by patients Functional Deficits. [] Progressing: [] Met: [] Not Met: [] Adjusted  3. Patient will demonstrate an increase in Strength to 4+/5 R RC strength to allow for proper functional mobility as indicated by patients Functional Deficits. [] Progressing: [] Met: [] Not Met: [] Adjusted  4. Patient will return to overhead reaching functional activities without increased symptoms or restriction. [] Progressing: [] Met: [] Not Met: [] Adjusted  5.  Patient to be able to reach behind back without pain(patient specific functional goal)    [] Progressing: [] Met: [] Not Met: [] Adjusted       Electronically signed by:  Agustina Monteiro, [facility]

## 2022-09-07 DIAGNOSIS — Z00.00 ENCOUNTER FOR ANNUAL PHYSICAL EXAM: ICD-10-CM

## 2022-09-07 LAB
A/G RATIO: 2.4 (ref 1.1–2.2)
ALBUMIN SERPL-MCNC: 4.5 G/DL (ref 3.4–5)
ALP BLD-CCNC: 44 U/L (ref 40–129)
ALT SERPL-CCNC: 19 U/L (ref 10–40)
ANION GAP SERPL CALCULATED.3IONS-SCNC: 14 MMOL/L (ref 3–16)
AST SERPL-CCNC: 19 U/L (ref 15–37)
BILIRUB SERPL-MCNC: 0.3 MG/DL (ref 0–1)
BUN BLDV-MCNC: 23 MG/DL (ref 7–20)
CALCIUM SERPL-MCNC: 9 MG/DL (ref 8.3–10.6)
CHLORIDE BLD-SCNC: 104 MMOL/L (ref 99–110)
CHOLESTEROL, TOTAL: 218 MG/DL (ref 0–199)
CO2: 24 MMOL/L (ref 21–32)
CREAT SERPL-MCNC: 0.9 MG/DL (ref 0.9–1.3)
GFR AFRICAN AMERICAN: >60
GFR NON-AFRICAN AMERICAN: >60
GLUCOSE BLD-MCNC: 95 MG/DL (ref 70–99)
HDLC SERPL-MCNC: 54 MG/DL (ref 40–60)
LDL CHOLESTEROL CALCULATED: 145 MG/DL
POTASSIUM SERPL-SCNC: 4.5 MMOL/L (ref 3.5–5.1)
SODIUM BLD-SCNC: 142 MMOL/L (ref 136–145)
TOTAL PROTEIN: 6.4 G/DL (ref 6.4–8.2)
TRIGL SERPL-MCNC: 93 MG/DL (ref 0–150)
VLDLC SERPL CALC-MCNC: 19 MG/DL

## 2022-09-08 ENCOUNTER — HOSPITAL ENCOUNTER (OUTPATIENT)
Dept: PHYSICAL THERAPY | Age: 57
Setting detail: THERAPIES SERIES
Discharge: HOME | End: 2022-09-08
Payer: COMMERCIAL

## 2022-09-12 ENCOUNTER — HOSPITAL ENCOUNTER (OUTPATIENT)
Dept: PHYSICAL THERAPY | Age: 57
Setting detail: THERAPIES SERIES
Discharge: HOME | End: 2022-09-12
Payer: COMMERCIAL

## 2022-09-12 PROCEDURE — 97140 MANUAL THERAPY 1/> REGIONS: CPT | Performed by: PHYSICAL THERAPIST

## 2022-09-12 PROCEDURE — 97110 THERAPEUTIC EXERCISES: CPT | Performed by: PHYSICAL THERAPIST

## 2022-09-12 NOTE — FLOWSHEET NOTE
[facility],  [address]  Phone: [phone]  Fax [phone]        Date:  2022    Patient Name:  Shaggy Jimenez    :  1965  MRN: [id number]  Restrictions/Precautions:  Spondylolisthesis lumbar spine   Medical/Treatment Diagnosis Information:  Diagnosis: M75.41 (ICD-10-CM) - Shoulder impingement, right  Treatment Diagnosis: M75.41 (ICD-10-CM) - Shoulder impingement, right  Insurance/Certification information:  PT Insurance Information: [employer]/30PT/No auth  Physician Information:  João Whyte   Has the plan of care been signed (Y/N):        []  Yes  [x]  No     Date of Patient follow up with Physician: none scheduled      Is this a Progress Report:     []  Yes  [x]  No        If Yes:  Date Range for reporting period:  Beginning22  Ending    Progress report will be due (10 Rx or 30 days whichever is less): 3/[birth year]/66       Recertification will be due (POC Duration  / 90 days whichever is less): 12 weeks      Visit # Insurance Allowable Auth Required   In-person 2 30 PT []  Yes [x]  No    Telehealth   []  Yes []  No    Total            Functional Scale: FOTO; 59/100    Date assessed:  22      Therapy Diagnosis/Practice Pattern:4E. Impaired joint mobility, motor function, muscle performance,  and ROM associated with localized inflammation. Number of Comorbidities:  []0     [x]1-2    []3+    Latex Allergy:  [x]NO      []YES  Preferred Language for Healthcare:   [x]English       []other:      Pain level:  3-5/10     SUBJECTIVE:  Overall reports 30% improvement since initial visit. with shoulder. Only able to do exercises 1x/day. Reports shld pain is more intermittent versus constant now. Complaints more regarding the low back and hand this visit. OBJECTIVE: See eval  Observation:   Test measurements:      RESTRICTIONS/PRECAUTIONS: Spondylolisthesis lumbar spine.  Skin CA history Exercises/Interventions:     Therapeutic Ex (80883) Sets/sec Reps Notes/CUES   Patient given HEP to start   See below         TB Rows /Shld Ext H5 2x10 reps    No money   X10  No resistance due to pain. Verbal cues to correct technique which is less painful   TB ER/IR GTB H5 2x10 reps + to HEP               FR pec stretches/lat stretches 3'  + to HEP                           Manual Intervention (86882)            PROM/mobs  10'                             NMR re-education (07941)   CUES NEEDED         Patient education regarding prognosis/posture correction and benefits of exercise 5'                                               Therapeutic Activity (50099)                                          HEP instruction: (see scanned forms)  Access Code: [email or web address]  URL: [email or web address]  Date: 08/31/2022  Prepared by: Jaimie Zamarripa     Exercises  Standing Shoulder Row with Anchored Resistance - 2 x daily - 7 x weekly - 2-3 sets - 10 reps - 5 hold  Shoulder extension with resistance - Neutral - 2 x daily - 7 x weekly - 2-3 sets - 10 reps - 5 hold  Shoulder External Rotation and Scapular Retraction with Resistance - 2 x daily - 7 x weekly - 2-3 sets - 10 reps - 5 hold  Corner Pec Major Stretch - 2 x daily - 7 x weekly - 1 sets - 5 reps - 30\" hold     Access Code: [email or web address]  URL: [email or web address]  Date: 09/12/2022  Prepared by: Jaimie Zamarripa    Exercises  Overhead Reach on Foam Roll - 1-2 x daily - 7 x weekly - 1 sets - 10 reps - 3' hold  Supine Chest Stretch on Foam Roll - 1-2 x daily - 7 x weekly - 1 sets - 10 reps - 3' hold  Shoulder Internal Rotation with Resistance - 2 x daily - 7 x weekly - 2-3 sets - 10 reps - 5 hold  Shoulder External Rotation with Anchored Resistance - 2 x daily - 7 x weekly - 2-3 sets - 10 reps - 5 hold      Therapeutic Exercise and NMR EXR  [x] (15717) Provided verbal/tactile cueing for activities related to strengthening, flexibility, endurance, ROM  for improvements in scapular, scapulothoracic and UE control with self care, reaching, carrying, lifting, house/yardwork, driving/computer work. [x] (43467) Provided verbal/tactile cueing for activities related to improving balance, coordination, kinesthetic sense, posture, motor skill, proprioception  to assist with  scapular, scapulothoracic and UE control with self care, reaching, carrying, lifting, house/yardwork, driving/computer work. Therapeutic Activities:    [] (11215 or 71413) Provided verbal/tactile cueing for activities related to improving balance, coordination, kinesthetic sense, posture, motor skill, proprioception and motor activation to allow for proper function of scapular, scapulothoracic and UE control with self care, carrying, lifting, driving/computer work.      Home Exercise Program:    [x] (98192) Reviewed/Progressed HEP activities related to strengthening, flexibility, endurance, ROM of scapular, scapulothoracic and UE control with self care, reaching, carrying, lifting, house/yardwork, driving/computer work  [] (92993) Reviewed/Progressed HEP activities related to improving balance, coordination, kinesthetic sense, posture, motor skill, proprioception of scapular, scapulothoracic and UE control with self care, reaching, carrying, lifting, house/yardwork, driving/computer work      Manual Treatments:  PROM / STM / Oscillations-Mobs:  G-I, II, III, IV (PA's, Inf., Post.)  [x] (38289) Provided manual therapy to mobilize soft tissue/joints of cervical/CT, scapular GHJ and UE for the purpose of modulating pain, promoting relaxation,  increasing ROM, reducing/eliminating soft tissue swelling/inflammation/restriction, improving soft tissue extensibility and allowing for proper ROM for normal function with self care, reaching, carrying, lifting, house/yardwork, driving/computer work    Modalities:  Deferred    Charges  Timed Code Treatment Minutes: 30'   Total Treatment Minutes: 30'           [] FLAVIO (LOW) 44689 too soon to assess goals progression  [] Other:     ASSESSMENT: Patient required verbal cues to correct the no money exercise with emphasis on scap control . Less pain in elbow noted with correction of exercise. Tolerated manual joint mobs without any pain this visit. Progress HEP this visit. Pt requires skilled intervention to restore ROM, strength, functional endurance and balance in order to perform ADLs without significant symptoms or limitations. Overall Progression Towards Functional goals/ Treatment Progress Update:  [] Patient is progressing as expected towards functional goals listed. [] Progression is slowed due to complexities/Impairments listed. [] Progression has been slowed due to co-morbidities.   [x] Plan just implemented, too soon to assess goals progression <30days   [] Goals require adjustment due to lack of progress  [] Patient is not progressing as expected and requires additional follow up with physician  [] Other    Prognosis for POC: [x] Good [] Fair  [] Poor      Patient requires continued skilled intervention: [x] Yes  [] No    Treatment/Activity Tolerance:  [x] Patient able to complete treatment  [] Patient limited by fatigue  [] Patient limited by pain    [] Patient limited by other medical complications  [] Other:         Return to Play: (if applicable)   []  Stage 1: Intro to Strength   []  Stage 2: Return to Run and Strength   []  Stage 3: Return to Jump and Strength   []  Stage 4: Dynamic Strength and Agility   []  Stage 5: Sport Specific Training     []  Ready to Return to Play, Meets All Above Stages   []  Not Ready for Return to Sports   Comments:                               PLAN: See eval. Progress scapular stabilization N.V. + RC strengthening  [x] Continue per plan of care [] Alter current plan (see comments above)  [] Plan of care initiated [] Hold pending MD visit [] Discharge      Electronically signed by:  Laron Hoyos, [address]    Note: If patient does not return for scheduled/ recommended follow up visits, this note will serve as a discharge from care along with most recent update on progress.

## 2022-09-19 ENCOUNTER — APPOINTMENT (OUTPATIENT)
Dept: PHYSICAL THERAPY | Age: 57
End: 2022-09-19
Payer: COMMERCIAL

## 2022-09-19 ENCOUNTER — HOSPITAL ENCOUNTER (OUTPATIENT)
Dept: PHYSICAL THERAPY | Age: 57
Setting detail: THERAPIES SERIES
Discharge: HOME | End: 2022-09-19
Payer: COMMERCIAL

## 2022-09-19 DIAGNOSIS — F32.A ANXIETY AND DEPRESSION: ICD-10-CM

## 2022-09-19 DIAGNOSIS — F41.9 ANXIETY AND DEPRESSION: ICD-10-CM

## 2022-09-19 PROCEDURE — 97110 THERAPEUTIC EXERCISES: CPT | Performed by: PHYSICAL THERAPIST

## 2022-09-19 PROCEDURE — 97140 MANUAL THERAPY 1/> REGIONS: CPT | Performed by: PHYSICAL THERAPIST

## 2022-09-19 RX ORDER — LAMOTRIGINE 100 MG/1
100 TABLET ORAL DAILY
Qty: 90 TABLET | Refills: 3 | Status: SHIPPED | OUTPATIENT
Start: 2022-09-19

## 2022-09-19 RX ORDER — DULOXETIN HYDROCHLORIDE 60 MG/1
60 CAPSULE, DELAYED RELEASE ORAL DAILY
Qty: 90 CAPSULE | Refills: 1 | Status: SHIPPED | OUTPATIENT
Start: 2022-09-19

## 2022-09-19 NOTE — FLOWSHEET NOTE
[facility],  [address]  Phone: [phone]  Fax [phone]        Date:  2022    Patient Name:  Shaggy Jimenez    :  1965  MRN: [id number]  Restrictions/Precautions:  Spondylolisthesis lumbar spine   Medical/Treatment Diagnosis Information:  Diagnosis: M75.41 (ICD-10-CM) - Shoulder impingement, right  Treatment Diagnosis: M75.41 (ICD-10-CM) - Shoulder impingement, right  Insurance/Certification information:  PT Insurance Information: [employer]/30PT/No auth  Physician Information:  João Whyte   Has the plan of care been signed (Y/N):        []  Yes  [x]  No     Date of Patient follow up with Physician: none scheduled      Is this a Progress Report: N.V     []  Yes  [x]  No        If Yes:  Date Range for reporting period:  Beginnin22  Ending:    Progress report will be due (10 Rx or 30 days whichever is less):        Recertification will be due (POC Duration  / 90 days whichever is less): 12 weeks      Visit # Insurance Allowable Auth Required   In-person 3 30 PT []  Yes [x]  No    Telehealth   []  Yes []  No    Total            Functional Scale: FOTO; 59/100    Date assessed:  22      Therapy Diagnosis/Practice Pattern:4E. Impaired joint mobility, motor function, muscle performance,  and ROM associated with localized inflammation. Number of Comorbidities:  []0     [x]1-2    []3+    Latex Allergy:  [x]NO      []YES  Preferred Language for Healthcare:   [x]English       []other:      Pain level:  3-5/10     SUBJECTIVE:  Overall reports 30% improvement since initial visit. with shoulder. Reports not being as good with performing the exercises since last visit. Frustrated on the lack of improvement overall. OBJECTIVE: See jeanine. Progressed HEP this visit. Observation:   Test measurements:      RESTRICTIONS/PRECAUTIONS: Spondylolisthesis lumbar spine.  Skin CA history - 2 sets - 5 hold  Supine Scapular Protraction in Flexion with Dumbbells - 1-2 x daily - 7 x weekly - 15 reps - 2 sets - 5 hold  Sidelying Shoulder Abduction Palm Forward - 1-2 x daily - 7 x weekly - 10 reps - 2-3 sets - 5 hold  Prone Shoulder Extension - Single Arm - 1-2 x daily - 7 x weekly - 10 reps - 2-3 sets - 5 hold  Sidelying Shoulder Horizontal Abduction - 1-2 x daily - 7 x weekly - 10 reps - 2-3 sets - 5 hold  Prone Shoulder Horizontal Abduction - 1-2 x daily - 7 x weekly - 3 sets - 10 reps - 5 hold      Therapeutic Exercise and NMR EXR  [x] (68135) Provided verbal/tactile cueing for activities related to strengthening, flexibility, endurance, ROM  for improvements in scapular, scapulothoracic and UE control with self care, reaching, carrying, lifting, house/yardwork, driving/computer work. [x] (30827) Provided verbal/tactile cueing for activities related to improving balance, coordination, kinesthetic sense, posture, motor skill, proprioception  to assist with  scapular, scapulothoracic and UE control with self care, reaching, carrying, lifting, house/yardwork, driving/computer work. Therapeutic Activities:    [] (56748 or 63801) Provided verbal/tactile cueing for activities related to improving balance, coordination, kinesthetic sense, posture, motor skill, proprioception and motor activation to allow for proper function of scapular, scapulothoracic and UE control with self care, carrying, lifting, driving/computer work.      Home Exercise Program:    [x] (69980) Reviewed/Progressed HEP activities related to strengthening, flexibility, endurance, ROM of scapular, scapulothoracic and UE control with self care, reaching, carrying, lifting, house/yardwork, driving/computer work  [] (63357) Reviewed/Progressed HEP activities related to improving balance, coordination, kinesthetic sense, posture, motor skill, proprioception of scapular, scapulothoracic and UE control with self care, reaching, carrying, lifting, house/yardwork, driving/computer work      Manual Treatments:  PROM / STM / Oscillations-Mobs:  G-I, II, III, IV (PA's, Inf., Post.)  [x] (75720) Provided manual therapy to mobilize soft tissue/joints of cervical/CT, scapular GHJ and UE for the purpose of modulating pain, promoting relaxation,  increasing ROM, reducing/eliminating soft tissue swelling/inflammation/restriction, improving soft tissue extensibility and allowing for proper ROM for normal function with self care, reaching, carrying, lifting, house/yardwork, driving/computer work    Modalities:  Deferred    Charges  Timed Code Treatment Minutes: 30'   Total Treatment Minutes: 30'           [] EVAL (LOW) 25897   [] EVAL (MOD) 71310   [] EVAL (HIGH) 80376   [] RE-EVAL     [x] YP(18222) x   1  [] IONTO  [] NMR (08286) x    [] VASO  [x] Manual (65204) x 1     [] Other:  [] TA x      [] Mech Traction (39027)  [] ES(attended) (08372)      [] ES (un) (61348):       GOALS:  Patient stated goal: painfree reaching  [] Progressing: [] Met: [] Not Met: [] Adjusted     Therapist goals for Patient:   Short Term Goals: To be achieved in: 2 weeks  1. Independent in HEP and progression per patient tolerance, in order to prevent re-injury. [] Progressing: [] Met: [] Not Met: [] Adjusted  2. Patient will have a decrease in pain to facilitate improvement in movement, function, and ADLs as indicated by Functional Deficits. [] Progressing: [] Met: [] Not Met: [] Adjusted     Long Term Goals: To be achieved in: 12 weeks  1. Disability index score of 72% or more per FOTO to assist with reaching prior level of function. [] Progressing: [] Met: [] Not Met: [] Adjusted  2. Patient will demonstrate increased AROM to WNL painfree all shoulder motions to allow for proper joint functioning as indicated by patients Functional Deficits. [] Progressing: [] Met: [] Not Met: [] Adjusted  3.  Patient will demonstrate an increase in Strength to 4+/5 R RC strength to allow for proper functional mobility as indicated by patients Functional Deficits. [] Progressing: [] Met: [] Not Met: [] Adjusted  4. Patient will return to overhead reaching functional activities without increased symptoms or restriction. [] Progressing: [] Met: [] Not Met: [] Adjusted  5. Patient to be able to reach behind back without pain(patient specific functional goal)    [] Progressing: [] Met: [] Not Met: [] Adjusted             Progression Towards Functional goals:  [] Patient is progressing as expected towards functional goals listed. [] Progression is slowed due to complexities listed. [] Progression has been slowed due to co-morbidities. [x] Plan just implemented, too soon to assess goals progression  [] Other:     ASSESSMENT:  Tolerated manual joint mobs without any pain this visit. Easily fatigued with table strengthening exercises. Progressed HEP this visit. Reports feeling very fatigued and a good soreness after session. Pt requires skilled intervention to restore ROM, strength, functional endurance and balance in order to perform ADLs without significant symptoms or limitations. Overall Progression Towards Functional goals/ Treatment Progress Update:  [] Patient is progressing as expected towards functional goals listed. [] Progression is slowed due to complexities/Impairments listed. [] Progression has been slowed due to co-morbidities.   [x] Plan just implemented, too soon to assess goals progression <30days   [] Goals require adjustment due to lack of progress  [] Patient is not progressing as expected and requires additional follow up with physician  [] Other    Prognosis for POC: [x] Good [] Fair  [] Poor      Patient requires continued skilled intervention: [x] Yes  [] No    Treatment/Activity Tolerance:  [x] Patient able to complete treatment  [] Patient limited by fatigue  [] Patient limited by pain    [] Patient limited by other medical complications  [] Other:         Return to Play: (if applicable)   []  Stage 1: Intro to Strength   []  Stage 2: Return to Run and Strength   []  Stage 3: Return to Jump and Strength   []  Stage 4: Dynamic Strength and Agility   []  Stage 5: Sport Specific Training     []  Ready to Return to Play, Meets All Above Stages   []  Not Ready for Return to Sports   Comments:                               PLAN: See eval. Progress scapular stabilization N.V. + RC strengthening. Re-assess N.V.  [x] Continue per plan of care [] Alter current plan (see comments above)  [] Plan of care initiated [] Hold pending MD visit [] Discharge      Electronically signed by:  Ritchie Guerrero, [address]    Note: If patient does not return for scheduled/ recommended follow up visits, this note will serve as a discharge from care along with most recent update on progress.

## 2022-09-19 NOTE — TELEPHONE ENCOUNTER
Pharmacy at [facility] state medication no longer under insurance. Medication were not Primary- Prescription need to only be sent to  [facility].    Lamotrigine and Duloxetine

## 2022-09-26 ENCOUNTER — HOSPITAL ENCOUNTER (OUTPATIENT)
Dept: PHYSICAL THERAPY | Age: 57
Setting detail: THERAPIES SERIES
Discharge: HOME | End: 2022-09-26
Payer: COMMERCIAL

## 2022-09-26 PROCEDURE — 97140 MANUAL THERAPY 1/> REGIONS: CPT | Performed by: PHYSICAL THERAPIST

## 2022-09-26 PROCEDURE — 97110 THERAPEUTIC EXERCISES: CPT | Performed by: PHYSICAL THERAPIST

## 2022-09-26 NOTE — FLOWSHEET NOTE
[facility], [address]  Phone: [phone]  Fax [phone]        Date:  2022    Patient Name:  Leann Naranjo    :  1965  MRN: [id number]  Restrictions/Precautions:  Spondylolisthesis lumbar spine   Medical/Treatment Diagnosis Information:  Diagnosis: M75.41 (ICD-10-CM) - Shoulder impingement, right  Treatment Diagnosis: M75.41 (ICD-10-CM) - Shoulder impingement, right  Insurance/Certification information:  PT Insurance Information: [place]/30PT/No auth  Physician Information:  Renee Baugh   Has the plan of care been signed (Y/N):        []  Yes  [x]  No     Date of Patient follow up with Physician: none scheduled      Is this a Progress Report:      [x]  Yes  []  No        If Yes:  Date Range for reporting period:  Beginnin22  Endin22    Progress report will be due (10 Rx or 30 days whichever is less):        Recertification will be due (POC Duration  / 90 days whichever is less): 12 weeks      Visit # Insurance Allowable Auth Required   In-person 4 30 PT []  Yes [x]  No    Telehealth   []  Yes []  No    Total            Functional Scale: FOTO; 59/100, 50/100    Date assessed:  22, 22      Therapy Diagnosis/Practice Pattern:4E. Impaired joint mobility, motor function, muscle performance,  and ROM associated with localized inflammation. Number of Comorbidities:  []0     [x]1-2    []3+    Latex Allergy:  [x]NO      []YES  Preferred Language for Healthcare:   [x]English       []other:      Pain level:  4/10     SUBJECTIVE:  Overall reports 30% improvement since initial visit. with shoulder. Reports initial pain that patient came in for has significantly improved. The pain is not located anterior shoulder anymore and is located anterior/lateral UE with raising arm and reaching across ed/out to side. OBJECTIVE: See eval. Progressed HEP this visit.   Observation: Test measurements:    OBJECTIVE  Test used 8/31/22 9/26/22   Pain Summary 0-10 3-5/10 4/10   Functional questionnaire FOTO 59/100 50/100   Functional Testing       AROM IR T10 p T9 p   ROM AROM Flex 160 p 165 p    AROM Abd WNL WNL   Strength Shld flex 4-p 4 -p    Shld abd  4 p 4- p    Shld ER 4- p 4- p       RESTRICTIONS/PRECAUTIONS: Spondylolisthesis lumbar spine. Skin CA history     Exercises/Interventions:     Therapeutic Ex (61584) Sets/sec Reps Notes/CUES   Patient given HEP to start   See below         TB Rows /Shld Ext H5 2x10 reps    No money   X10  No resistance due to pain. Verbal cues to correct technique which is less painful   TB ER/IR GTB H5 2x10 reps HEP               FR pec stretches/lat stretches 3'  HEP   SA punches 2# H2 2x15 reps +HEP   SL abd 2#/1# H3 1x10 each wt 2# fatiguing during exercise. + HEP   SL ER 1# H3 2x10  + HEP   Horizontal abd/add modified pain free range. 1#  2x10 reps + HEP   Prone shld ext 1#  H2 2x10 reps + HEP   Prone mid trap no weight  2x10 reps + HEP   Manual Intervention (99957)            PROM/mobs  10'                             NMR re-education (45471)   CUES NEEDED         Patient education regarding prognosis/posture correction and benefits of exercise 5'                                               Therapeutic Activity (41593)                                          HEP instruction: (see scanned forms)  Access Code: [email or web address]  URL: [email or web address]  Date: 09/19/2022  Prepared by: Christ Cogan    Exercises  Overhead Reach on Foam Roll - 1-2 x daily - 7 x weekly - 1 sets - 10 reps - 3' hold  Supine Chest Stretch on Foam Roll - 1-2 x daily - 7 x weekly - 1 sets - 10 reps - 3' hold  Shoulder Internal Rotation with Resistance - 2 x daily - 7 x weekly - 2-3 sets - 10 reps - 5 hold  Shoulder External Rotation with Anchored Resistance - 2 x daily - 7 x weekly - 2-3 sets - 10 reps - 5 hold  Standing Shoulder Row with Anchored Resistance - 2 x daily - 7 x weekly - 10 reps - 3 sets  Shoulder extension with resistance - Neutral - 2 x daily - 7 x weekly - 10 reps - 3 sets  Shoulder External Rotation and Scapular Retraction with Resistance - 2 x daily - 7 x weekly - 10 reps - 3 sets  Corner Pec Major Stretch - 2 x daily - 7 x weekly - 5 reps - 1 sets - 30\" hold  Sidelying Shoulder ER with Towel and Dumbbell - 1-2 x daily - 7 x weekly - 15 reps - 2 sets - 5 hold  Supine Scapular Protraction in Flexion with Dumbbells - 1-2 x daily - 7 x weekly - 15 reps - 2 sets - 5 hold  Sidelying Shoulder Abduction Palm Forward - 1-2 x daily - 7 x weekly - 10 reps - 2-3 sets - 5 hold  Prone Shoulder Extension - Single Arm - 1-2 x daily - 7 x weekly - 10 reps - 2-3 sets - 5 hold  Sidelying Shoulder Horizontal Abduction - 1-2 x daily - 7 x weekly - 10 reps - 2-3 sets - 5 hold  Prone Shoulder Horizontal Abduction - 1-2 x daily - 7 x weekly - 3 sets - 10 reps - 5 hold      Therapeutic Exercise and NMR EXR  [x] (13627) Provided verbal/tactile cueing for activities related to strengthening, flexibility, endurance, ROM  for improvements in scapular, scapulothoracic and UE control with self care, reaching, carrying, lifting, house/yardwork, driving/computer work. [x] (93083) Provided verbal/tactile cueing for activities related to improving balance, coordination, kinesthetic sense, posture, motor skill, proprioception  to assist with  scapular, scapulothoracic and UE control with self care, reaching, carrying, lifting, house/yardwork, driving/computer work. Therapeutic Activities:    [] (80970 or 40954) Provided verbal/tactile cueing for activities related to improving balance, coordination, kinesthetic sense, posture, motor skill, proprioception and motor activation to allow for proper function of scapular, scapulothoracic and UE control with self care, carrying, lifting, driving/computer work.      Home Exercise Program:    [x] (95684) Reviewed/Progressed HEP activities related to strengthening, flexibility, endurance, ROM of scapular, scapulothoracic and UE control with self care, reaching, carrying, lifting, house/yardwork, driving/computer work  [] (32931) Reviewed/Progressed HEP activities related to improving balance, coordination, kinesthetic sense, posture, motor skill, proprioception of scapular, scapulothoracic and UE control with self care, reaching, carrying, lifting, house/yardwork, driving/computer work      Manual Treatments:  PROM / STM / Oscillations-Mobs:  G-I, II, III, IV (PA's, Inf., Post.)  [x] (60330) Provided manual therapy to mobilize soft tissue/joints of cervical/CT, scapular GHJ and UE for the purpose of modulating pain, promoting relaxation,  increasing ROM, reducing/eliminating soft tissue swelling/inflammation/restriction, improving soft tissue extensibility and allowing for proper ROM for normal function with self care, reaching, carrying, lifting, house/yardwork, driving/computer work    Modalities:  Deferred    Charges  Timed Code Treatment Minutes: 30'   Total Treatment Minutes: 30'           [] EVAL (LOW) 68880   [] EVAL (MOD) 13926   [] EVAL (HIGH) 41019   [] RE-EVAL     [x] OQ(36080) x   1  [] IONTO  [] NMR (19511) x  [] VASO  [x] Manual (10586) x  1    [] Other:  [] TA x      [] Mech Traction (28499)  [] ES(attended) (55067)      [] ES (un) (22336):       GOALS:  Patient stated goal: painfree reaching  [] Progressing: [] Met: [x] Not Met: [] Adjusted     Therapist goals for Patient:   Short Term Goals: To be achieved in: 2 weeks  1. Independent in HEP and progression per patient tolerance, in order to prevent re-injury. [] Progressing: [x] Met: [] Not Met: [] Adjusted  2. Patient will have a decrease in pain to facilitate improvement in movement, function, and ADLs as indicated by Functional Deficits. [] Progressing: [x] Met: [] Not Met: [] Adjusted     Long Term Goals: To be achieved in: 12 weeks  1.  Disability index score of 72% or more per FOTO to assist with reaching prior level of function. [] Progressing: [] Met: [x] Not Met: [] Adjusted  2. Patient will demonstrate increased AROM to WNL painfree all shoulder motions to allow for proper joint functioning as indicated by patients Functional Deficits. [] Progressing: [] Met: [x] Not Met: [] Adjusted  3. Patient will demonstrate an increase in Strength to 4+/5 R RC strength to allow for proper functional mobility as indicated by patients Functional Deficits. [] Progressing: [] Met: [x] Not Met: [] Adjusted  4. Patient will return to overhead reaching functional activities without increased symptoms or restriction. [] Progressing: [] Met: [x] Not Met: [] Adjusted  5. Patient to be able to reach behind back without pain(patient specific functional goal)    [] Progressing: [] Met: [x] Not Met: [] Adjusted             Progression Towards Functional goals:  [] Patient is progressing as expected towards functional goals listed. [x] Progression is slowed due to complexities listed. [] Progression has been slowed due to co-morbidities. [] Plan just implemented, too soon to assess goals progression  [] Other:     ASSESSMENT:  Overall, patient has improved with original pain that he came in for, but still has anterior/lateral shoulder pain with reaching activities. + Impingement signs and RC weakness noted R shld with MMT. Due to minimal improvements overall, recommend an orthopedic consult. Patient to continue with HEP and will hold PT until after ortho MD visit pending POC. Overall Progression Towards Functional goals/ Treatment Progress Update:  [] Patient is progressing as expected towards functional goals listed. [x] Progression is slowed due to complexities/Impairments listed. [] Progression has been slowed due to co-morbidities.   [] Plan just implemented, too soon to assess goals progression <30days   [] Goals require adjustment due to lack of progress  [] Patient is not progressing as expected and requires additional follow up with physician  [] Other    Prognosis for POC: [x] Good [] Fair  [] Poor      Patient requires continued skilled intervention: [x] Yes  [] No    Treatment/Activity Tolerance:  [x] Patient able to complete treatment  [] Patient limited by fatigue  [] Patient limited by pain    [] Patient limited by other medical complications  [] Other:         Return to Play: (if applicable)   []  Stage 1: Intro to Strength   []  Stage 2: Return to Run and Strength   []  Stage 3: Return to Jump and Strength   []  Stage 4: Dynamic Strength and Agility   []  Stage 5: Sport Specific Training     []  Ready to Return to Play, Meets All Above Stages   []  Not Ready for Return to Sports   Comments:                               PLAN: Patient to see orthopaedic MD for shoulder due to lack of progress. Hold PT until further notice pending MD POC. [] Continue per plan of care [x] Alter current plan (see comments above)  [] Plan of care initiated [] Hold pending MD visit [] Discharge      Electronically signed by:  Hua Redmond, [address]    Note: If patient does not return for scheduled/ recommended follow up visits, this note will serve as a discharge from care along with most recent update on progress.

## 2022-09-27 ENCOUNTER — OFFICE VISIT (OUTPATIENT)
Dept: ORTHOPEDIC SURGERY | Age: 57
End: 2022-09-27
Payer: COMMERCIAL

## 2022-09-27 DIAGNOSIS — M75.81 TENDINITIS OF RIGHT ROTATOR CUFF: Primary | ICD-10-CM

## 2022-09-27 DIAGNOSIS — M25.511 RIGHT SHOULDER PAIN, UNSPECIFIED CHRONICITY: ICD-10-CM

## 2022-09-27 DIAGNOSIS — M75.21 BICEPS TENDONITIS ON RIGHT: ICD-10-CM

## 2022-09-27 PROCEDURE — 20610 DRAIN/INJ JOINT/BURSA W/O US: CPT | Performed by: ORTHOPAEDIC SURGERY

## 2022-09-27 PROCEDURE — 99203 OFFICE O/P NEW LOW 30 MIN: CPT | Performed by: ORTHOPAEDIC SURGERY

## 2022-09-27 RX ORDER — MELOXICAM 15 MG/1
15 TABLET ORAL DAILY PRN
Qty: 30 TABLET | Refills: 0 | Status: SHIPPED | OUTPATIENT
Start: 2022-09-27

## 2022-09-27 RX ORDER — TRIAMCINOLONE ACETONIDE 40 MG/ML
40 INJECTION, SUSPENSION INTRA-ARTICULAR; INTRAMUSCULAR ONCE
Status: COMPLETED | OUTPATIENT
Start: 2022-09-27 | End: 2022-09-27

## 2022-09-27 RX ORDER — ROPIVACAINE HYDROCHLORIDE 5 MG/ML
30 INJECTION, SOLUTION EPIDURAL; INFILTRATION; PERINEURAL ONCE
Status: COMPLETED | OUTPATIENT
Start: 2022-09-27 | End: 2022-09-27

## 2022-09-27 RX ADMIN — TRIAMCINOLONE ACETONIDE 40 MG: 40 INJECTION, SUSPENSION INTRA-ARTICULAR; INTRAMUSCULAR at 14:55

## 2022-09-27 RX ADMIN — ROPIVACAINE HYDROCHLORIDE 30 ML: 5 INJECTION, SOLUTION EPIDURAL; INFILTRATION; PERINEURAL at 14:53

## 2022-10-26 ENCOUNTER — NURSE ONLY (OUTPATIENT)
Dept: FAMILY MEDICINE CLINIC | Age: 57
End: 2022-10-26
Payer: COMMERCIAL

## 2022-10-26 PROCEDURE — 90471 IMMUNIZATION ADMIN: CPT | Performed by: STUDENT IN AN ORGANIZED HEALTH CARE EDUCATION/TRAINING PROGRAM

## 2022-10-26 PROCEDURE — 90674 CCIIV4 VAC NO PRSV 0.5 ML IM: CPT | Performed by: STUDENT IN AN ORGANIZED HEALTH CARE EDUCATION/TRAINING PROGRAM

## 2022-11-23 ENCOUNTER — OFFICE VISIT (OUTPATIENT)
Dept: ORTHOPEDIC SURGERY | Age: 57
End: 2022-11-23
Payer: COMMERCIAL

## 2022-11-23 VITALS — HEIGHT: 65 IN | WEIGHT: 144 LBS | RESPIRATION RATE: 15 BRPM | BODY MASS INDEX: 23.99 KG/M2

## 2022-11-23 DIAGNOSIS — R25.2 HAND CRAMPS: Primary | ICD-10-CM

## 2022-11-23 PROCEDURE — 99214 OFFICE O/P EST MOD 30 MIN: CPT | Performed by: ORTHOPAEDIC SURGERY

## 2022-11-23 NOTE — PROGRESS NOTES
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 This has been present for a few years. There is no history of injury. There is associated  transient pain and stiffness. This does not seem to happen in other fingers or the other hand. It usually occurs during extended periods of intense work at the computer. Similar symptoms can involve the legs at night. He denies numbness, paresthesias,  weakness or tremor. Treatment has been recommended(hydration, extra potassium). . Symptoms have slightly improved recently. He complains of chronic right shoulder tendonitis, which is under orthopedic care and is concerned that the hand symptoms may be connected with the shoulder problem. The pain assessment has been reviewed and is correct. The patient's social history, past medical history, family history, medications, allergies and review of systems, entered 9/27/22, have been reviewed, and dated and are recorded in the chart. On physical examination the patient is Height: 5' 5\" (165.1 cm) tall and weighs Weight: 144 lb (65.3 kg). Respirations ane 18 per minute. The patient is well nourished, is oriented to time and place, demonstrates appropriate mood and affect as well as normal gait and station. There is no swelling, discoloration, deformity or atrophy of either hand or wrist.  There is no associated tenderness. Range of motion of the fingers, thumbs, wrists and elbows is full and painless bilaterally. Distal sensation and circulation are normal.  All joints are stable. There is no tenderness or thickening of the flexor tendon sheaths of the digits. There is no triggering of any digit even with manual pressure over the A1 pulleys. There are no subcutaneous masses or enlarged epitrochlear lymph nodes.     I have personally reviewed and interpreted all previous external imaging studies, laboratory tests(CBC, Renal Function Panel, Hepatic Function Panel, TSH, PSA), diagnostic procedures and medical encounters pertinent to this patient's visit today. Impression: Cramping of right hand. The nature of this annoying medical problem is fully discussed with the patient, including all treatment options. All questions are answered. Neither steroid injection nor surgery are indicated at this time or known to be helpful in the treatment of this malady. Perhaps his PCP might recommend any other treatment thought to be helpful.

## 2023-01-05 ASSESSMENT — ENCOUNTER SYMPTOMS
RHINORRHEA: 1
EYE PAIN: 0
NAUSEA: 0
VOMITING: 0
COUGH: 0
SHORTNESS OF BREATH: 0

## 2023-01-05 NOTE — PROGRESS NOTES
[facility]      Patient Name: Alma Oconnell  Medical Record Number:  [id number]  Primary Care Physician:  Damion Oscar DO  Date of Consultation: 1/6/2023    Chief Complaint:   Chief Complaint   Patient presents with    Follow-up     Patient is here to follow up on allergies. He states that he is feeling well but doesn't generally have issues this time of year. HISTORY OF PRESENT ILLNESS  Benjie Gastelum is a(n) 56 y.o. male who presents evaluation of a lump on his neck. He states is been there for many years and has not changed. His franco noticed it and he is concerned and would like it checked out. He also has seasonal allergies and has used Flonase and over-the-counter allergy medications with some relief. He was on allergy shots for a long period in his life. He does not have any sinus infections that he complains of. He also has a low ringing in his ears. He has not had a recent hearing test but is not interested in hearing test this point time. Interval History 5/6/2022  He has been doing well with his allergies after adding Astelin. He is not overly concerned with his tinnitus. Interval history 1/6/2023  He has been doing okay using Astelin and Zyrtec. He states that there is room for improvement.     Patient Active Problem List   Diagnosis    Seasonal allergic rhinitis due to pollen    Psoriasis    Anxiety and depression    Tinnitus of both ears    Chronic right shoulder pain    Right lateral epicondylitis    Hand cramps     Past Surgical History:   Procedure Laterality Date    APPENDECTOMY      COLONOSCOPY  2007    COLONOSCOPY  08/25/2017    MOHS SURGERY  2009    VASECTOMY      WISDOM TOOTH EXTRACTION       Family History   Problem Relation Age of Onset    Cancer Father         brain    Heart Disease Father     Arrhythmia Father     Other Maternal Uncle         schizophrenia    Other Maternal Grandfather         schizophrenia    Colon Cancer Mother      Social History     Socioeconomic History    Marital status:      Spouse name: Not on file    Number of children: Not on file    Years of education: Not on file    Highest education level: Not on file   Occupational History    Not on file   Tobacco Use    Smoking status: Never    Smokeless tobacco: Never   Vaping Use    Vaping Use: Never used   Substance and Sexual Activity    Alcohol use: No    Drug use: No    Sexual activity: Never   Other Topics Concern    Not on file   Social History Narrative    Not on file     Social Determinants of Health     Financial Resource Strain: Not on file   Food Insecurity: Not on file   Transportation Needs: Not on file   Physical Activity: Not on file   Stress: Not on file   Social Connections: Not on file   Intimate Partner Violence: Not on file   Housing Stability: Not on file       DRUG/FOOD ALLERGIES: Patient has no known allergies. CURRENT MEDICATIONS  Prior to Admission medications    Medication Sig Start Date End Date Taking? Authorizing Provider   DULoxetine (CYMBALTA) 60 MG extended release capsule Take 1 capsule by mouth daily 9/19/22  Yes Cynda Pulling Schklar, DO   lamoTRIgine (LAMICTAL) 100 MG tablet Take 1 tablet by mouth daily 9/19/22  Yes Cynda Pulling Schklar, DO   L-Methylfolate 15 MG TABS Take 1 tablet by mouth daily 8/16/22  Yes Cynda Pulling Schklar, DO   azelastine (ASTELIN) 0.1 % nasal spray 1 spray by Nasal route 2 times daily Use in each nostril as directed 2/18/22  Yes Ten Ward, DO   L-Methylfolate-Algae (DEPLIN 15 PO) Take by mouth   Yes Historical Provider, MD   econazole nitrate 1 % cream Apply topically daily Apply topically daily.    Yes Historical Provider, MD   Hydrocortisone Butyrate 0.1 % CREA Apply topically daily    Yes Historical Provider, MD   naphazoline-pheniramine (NAPHCON-A) 0.025-0.3 % ophthalmic solution Place 1 drop into both eyes as needed    Yes Historical Provider, MD   cetirizine (ZYRTEC) 10 MG tablet Take 10 mg by mouth daily   Yes Historical Provider, MD   Multiple Vitamins-Minerals (THERAPEUTIC MULTIVITAMIN-MINERALS) tablet Take 1 tablet by mouth daily   Yes Historical Provider, MD   meloxicam (MOBIC) 15 MG tablet Take 1 tablet by mouth daily as needed for Pain  Patient not taking: Reported on 1/6/2023 9/27/22   Jaiden Centeno MD   fluticasone [facility]) 50 MCG/ACT nasal spray 1 spray by Nasal route daily  Patient not taking: Reported on 1/6/2023 5/22/17   Mook Macdonald MD   Potassium 99 MG TABS Take by mouth daily  Patient not taking: Reported on 1/6/2023    Historical Provider, MD     REVIEW OF SYSTEMS  The following systems were reviewed and revealed the following in addition to any already discussed in the HPI:    Review of Systems   Constitutional:  Negative for fatigue and fever. HENT:  Positive for postnasal drip, rhinorrhea and tinnitus. Negative for congestion, ear pain and sneezing. Eyes:  Negative for pain and visual disturbance. Respiratory:  Negative for cough and shortness of breath. Cardiovascular:  Negative for chest pain. Gastrointestinal:  Negative for nausea and vomiting. Endocrine: Negative. Genitourinary: Negative. Musculoskeletal:  Negative for neck pain and neck stiffness. Skin:  Negative for rash. Neurological:  Negative for dizziness and headaches.       PHYSICAL EXAM  /79 (Site: Left Upper Arm, Position: Sitting, Cuff Size: Medium Adult)   Pulse 78   Temp 97.5 °F (36.4 °C) (Infrared)   Resp 16   Ht 5' 5\" (1.651 m)   Wt 140 lb (63.5 kg)   BMI 23.30 kg/m²     GENERAL: No Acute Distress, Alert and Oriented, no hoarseness  EYES: EOMI, Anti-icteric  NOSE: No epistaxis, nasal mucosa within normal limits, no purulent drainage, 3+ deviated septum to the left  EARS: Normal external canal appearance, EAC patent bilaterally, TMs intact bilaterally with no evidence of effusion  FACE: 1/6 House-Brackmann Scale, symmetric, sensation equal bilaterally  ORAL CAVITY: No masses or lesions palpated, uvula is midline, moist mucous membranes  NECK: Normal range of motion, no thyromegaly, trachea is midline, no lymphadenopathy, no neck masses, no crepitus  CHEST: Normal respiratory effort, no retractions, breathing comfortably  SKIN: No rashes, normal appearing skin, no evidence of skin lesions/tumors    RADIOLOGY  Summary of findings:    PROCEDURE    ASSESSMENT/PLAN  Darcie Paredes is a very pleasant 56 y.o. male with     1. Seasonal allergies  He will add Flonase to his Astelin and Zyrtec. We will also obtain allergy testing to see if there is anything in the environment he can alter or potentially we will do shots.  - NE PERCUTANEOUS TESTS W/ALLERGENIC EXTRACTS  - NE IC TSTS W/ALLGIC XTRCS DLYD TYP RXN W/READING    2. Deviated nasal septum  We will monitor this. Follow-up after testing has been completed. Medical Decision Making:   The following items were considered in medical decision making:  Independent review of images  Review / order clinical lab tests  Review / order radiology tests  Decision to obtain old records

## 2023-01-06 ENCOUNTER — OFFICE VISIT (OUTPATIENT)
Dept: ENT CLINIC | Age: 58
End: 2023-01-06

## 2023-01-06 VITALS
TEMPERATURE: 97.5 F | WEIGHT: 140 LBS | SYSTOLIC BLOOD PRESSURE: 119 MMHG | HEART RATE: 78 BPM | RESPIRATION RATE: 16 BRPM | HEIGHT: 65 IN | DIASTOLIC BLOOD PRESSURE: 79 MMHG | BODY MASS INDEX: 23.32 KG/M2

## 2023-01-06 DIAGNOSIS — J34.2 DEVIATED NASAL SEPTUM: ICD-10-CM

## 2023-01-06 DIAGNOSIS — J30.2 SEASONAL ALLERGIES: Primary | ICD-10-CM

## 2023-01-11 ENCOUNTER — TELEPHONE (OUTPATIENT)
Dept: ENT CLINIC | Age: 58
End: 2023-01-11

## 2023-01-26 NOTE — TELEPHONE ENCOUNTER
LM regarding referral for Allergy Testing to discuss procedure, medications to review prior to testing and scheduling. Left my name and contact numbers for both Smith and Stacey Awan.  Will await return call
Patient returned Jenny's call and would like to move forward with allergy testing. Patient states sometimes he cannot answer the phone but the best number to reach him at is [phone]. Please call patient to schedule allergy testing.
Returned call to Mr. Graciela Cheek. Discussed TXU Nate which he received form  @ last visit. Discussed preparation for testing,medications to withhold and expectations for testing. Scheduled for 2/7/23 @ 10 for Allergy testing.     Gave number and contact to call with questions
Toe amputation status, right  (2008)

## 2023-02-07 ENCOUNTER — NURSE ONLY (OUTPATIENT)
Dept: ENT CLINIC | Age: 58
End: 2023-02-07
Payer: COMMERCIAL

## 2023-02-07 VITALS
SYSTOLIC BLOOD PRESSURE: 120 MMHG | HEART RATE: 74 BPM | TEMPERATURE: 98.2 F | DIASTOLIC BLOOD PRESSURE: 79 MMHG | RESPIRATION RATE: 20 BRPM

## 2023-02-07 DIAGNOSIS — J30.1 SEASONAL ALLERGIC RHINITIS DUE TO POLLEN: Primary | ICD-10-CM

## 2023-02-07 PROCEDURE — 95004 PERQ TESTS W/ALRGNC XTRCS: CPT | Performed by: STUDENT IN AN ORGANIZED HEALTH CARE EDUCATION/TRAINING PROGRAM

## 2023-02-07 PROCEDURE — 95024 IQ TESTS W/ALLERGENIC XTRCS: CPT | Performed by: STUDENT IN AN ORGANIZED HEALTH CARE EDUCATION/TRAINING PROGRAM

## 2023-02-07 NOTE — PROGRESS NOTES
Allergy Testing Note        After obtaining consent, and per orders of Dr Alexsander Sutton, injections of allergy serum given per documentation below by Elva Rogers RN. Test Information  Consent: Yes  Time Antigens Placed: 0544 (following skin testing)  Location: [facility] ([facility])  Allergen : ALK Prosper  Testing Nurse:  ARAVIND Harris  Reviewing Physician: Dr. Clare Chavarria. Mitts  Amount Injected (mL): 0.01    Controls  Negative 0.05% Glycerine-Saline: Not tested  Positive Histamine 1 mg/ml: Not tested    Ruth Sunny: Not tested  Paper Juan Jose [place]: Not tested  Box Elder:  (EP = 6)  Red Henry: 0  American Elm: 4+  Hackberry: Not tested  Dereje George: Not tested  Sugar Maple: Not tested  [employer] [place]: 4+  Bur [place]: Not tested  DecImmune Therapeutics: Not tested  Sealed Air Corporation: Not tested  Beam Express Corporation: 3+  Black [place]: Not tested  Dereje Lockwood: Not tested  [employer]:  (EP = 6)  Kylah Balzarine:  (EP = 6)  Tillman Eastern: 4+  [place]/Pecan Mix: 4+  Red Maple: 4+  White Oak: 4+    Others  [employer] Insurance Mite:  (EP = 6)  Dog Dander: 0  Cat Dander: 4+  Feather (Mixed): 0  Cockroach: 3+    Grasses  Ky Bluegrass: Not tested  Smooth Brome: Not tested  Moldova: Not tested  Interlochen's Pride Fescue: Not tested  Interlochen's Pride Fescue: 4+  Luxembourg Freeman: Not tested  Sherill Hoopeston: 4+  Bermuda:  (EP = 5)  Alexis:  (EP = 5)    Weeds  Cocklebur: Not tested  Lamb's Quarter: 4+  Burweed Common: Not tested  Mugwort: Not tested  English Plantain: 4+  Giant Ragweed: Not tested  Short Ragweed: Not tested  Sheep Sorrel: 4+  Kochia: 4+  Red Root Pigweed:  (Rough Pigweed- EP=4)  Mixed Ragweed: 4+    Molds/Fungi  Alternaria Hormodendrum: 0  Dreschlera: Not tested  Epicoccum Nigrum: 0  Epidermorphyto Floccosum: Not tested  Fusarium Moniliforme: Not tested  Fusarium Solani: Not tested  Geotrichum Candidum: Not tested  Gliocladium: Not tested  Deliquescence: Not tested  Spondylocladium: Not tested  Atrovirens Microsporum: Not tested  Phoma Betae: Not tested  Rhizopus Oryzae: Not tested  Rhodotorula: 3+  Saccharomyces Cerevisiae : Not tested  Stemphylium Solani: Not tested  Trichoderma Viride: Not tested  Trichophyton Mentagrophytes: Not tested  Cephalothecium Roseum: Not tested  Acremonium Strictum: Not tested  Aspergillus Flavus: Not tested  Aspergillus Fumigatus: 0  Aspergillus Pioche: Not tested  Aureobasidium Pullulans: 0  Bipolaris Sorokiniana: 0  Candida Albicans: 0  Chaetomium Glosbosum: Not tested  Cladosporium Cladosporioides: 0  Gibberella Pulicaris: 0  Mucor Plumbeus: 3+  Penniccillum Notatum: 0    Hank  instructed to remain in clinic for 30 minutes post testing and to report any adverse reactions immediately. No changes noted in patient status post testing. Testing was completed today. Please view results in the media tab. Patient has many allergies noted. Recommend Allergy Immunotherapy.   Patient is scheduled for allergy testing f/u appt with Phillip Delgado MD on 2/14/2 @ 10am

## 2023-02-16 ENCOUNTER — OFFICE VISIT (OUTPATIENT)
Dept: ENT CLINIC | Age: 58
End: 2023-02-16
Payer: COMMERCIAL

## 2023-02-16 VITALS
DIASTOLIC BLOOD PRESSURE: 84 MMHG | BODY MASS INDEX: 23.32 KG/M2 | WEIGHT: 140 LBS | HEART RATE: 77 BPM | HEIGHT: 65 IN | TEMPERATURE: 97.9 F | SYSTOLIC BLOOD PRESSURE: 126 MMHG | RESPIRATION RATE: 16 BRPM

## 2023-02-16 DIAGNOSIS — J30.1 SEASONAL ALLERGIC RHINITIS DUE TO POLLEN: Primary | ICD-10-CM

## 2023-02-16 DIAGNOSIS — J34.2 DEVIATED NASAL SEPTUM: ICD-10-CM

## 2023-02-16 PROCEDURE — 99213 OFFICE O/P EST LOW 20 MIN: CPT | Performed by: STUDENT IN AN ORGANIZED HEALTH CARE EDUCATION/TRAINING PROGRAM

## 2023-02-16 RX ORDER — FLUTICASONE PROPIONATE 50 MCG
2 SPRAY, SUSPENSION (ML) NASAL 2 TIMES DAILY
Qty: 48 G | Refills: 4 | Status: SHIPPED | OUTPATIENT
Start: 2023-02-16

## 2023-02-16 RX ORDER — AZELASTINE 1 MG/ML
1 SPRAY, METERED NASAL 2 TIMES DAILY
Qty: 30 ML | Refills: 4 | Status: SHIPPED | OUTPATIENT
Start: 2023-02-16

## 2023-02-16 ASSESSMENT — ENCOUNTER SYMPTOMS
VOMITING: 0
EYE PAIN: 0
NAUSEA: 0
SHORTNESS OF BREATH: 0
RHINORRHEA: 1
COUGH: 0

## 2023-02-16 NOTE — PROGRESS NOTES
[facility]      Patient Name: Carol Oliveira  Medical Record Number:  [id number]  Primary Care Physician:  Irina Garica DO  Date of Consultation: 2/16/2023    Chief Complaint:   Chief Complaint   Patient presents with    Follow-up     Patient stats that he is here to follow up on allergy testing. HISTORY OF PRESENT ILLNESS  Chas Moran is a(n) 66 y.o. male who presents evaluation of a lump on his neck. He states is been there for many years and has not changed. His franco noticed it and he is concerned and would like it checked out. He also has seasonal allergies and has used Flonase and over-the-counter allergy medications with some relief. He was on allergy shots for a long period in his life. He does not have any sinus infections that he complains of. He also has a low ringing in his ears. He has not had a recent hearing test but is not interested in hearing test this point time. Interval History 5/6/2022  He has been doing well with his allergies after adding Astelin. He is not overly concerned with his tinnitus. Interval history 1/6/2023  He has been doing okay using Astelin and Zyrtec. He states that there is room for improvement. Interval history 2/16/2023  Chas Moran underwent allergy testing and was seen to have multiple allergens. He has done allergy shots in the past and is unsure if he would like to move forward these at this point in time.     Patient Active Problem List   Diagnosis    Seasonal allergic rhinitis due to pollen    Psoriasis    Anxiety and depression    Tinnitus of both ears    Chronic right shoulder pain    Right lateral epicondylitis    Hand cramps     Past Surgical History:   Procedure Laterality Date    APPENDECTOMY      COLONOSCOPY  2007    COLONOSCOPY  08/25/2017    MOHS SURGERY  2009    VASECTOMY      WISDOM TOOTH EXTRACTION       Family History   Problem Relation Age of Onset    Cancer Father         brain Heart Disease Father     Arrhythmia Father     Other Maternal Uncle         schizophrenia    Other Maternal Grandfather         schizophrenia    Colon Cancer Mother      Social History     Socioeconomic History    Marital status:      Spouse name: Not on file    Number of children: Not on file    Years of education: Not on file    Highest education level: Not on file   Occupational History    Not on file   Tobacco Use    Smoking status: Never    Smokeless tobacco: Never   Vaping Use    Vaping Use: Never used   Substance and Sexual Activity    Alcohol use: No    Drug use: No    Sexual activity: Never   Other Topics Concern    Not on file   Social History Narrative    Not on file     Social Determinants of Health     Financial Resource Strain: Not on file   Food Insecurity: Not on file   Transportation Needs: Not on file   Physical Activity: Not on file   Stress: Not on file   Social Connections: Not on file   Intimate Partner Violence: Not on file   Housing Stability: Not on file     DRUG/FOOD ALLERGIES: Patient has no known allergies. CURRENT MEDICATIONS  Prior to Admission medications    Medication Sig Start Date End Date Taking? Authorizing Provider   azelastine (ASTELIN) 0.1 % nasal spray 1 spray by Nasal route 2 times daily Use in each nostril as directed 2/16/23  Yes Ten Ward DO   fluticasone (FLONASE) 50 MCG/ACT nasal spray 2 sprays by Each Nostril route in the morning and at bedtime 2/16/23  Yes Ten Ward DO   DULoxetine (CYMBALTA) 60 MG extended release capsule Take 1 capsule by mouth daily 9/19/22  Yes Trinity Sterling DO   lamoTRIgine (LAMICTAL) 100 MG tablet Take 1 tablet by mouth daily 9/19/22  Yes Trinity Sterling DO   L-Methylfolate 15 MG TABS Take 1 tablet by mouth daily 8/16/22  Yes Trinity Sterling DO   econazole nitrate 1 % cream Apply topically daily Apply topically daily.    Yes Historical Provider, MD   Hydrocortisone Butyrate 0.1 % CREA Apply topically daily    Yes Historical Provider, MD   naphazoline-pheniramine (NAPHCON-A) 0.025-0.3 % ophthalmic solution Place 1 drop into both eyes as needed    Yes Historical Provider, MD   cetirizine (ZYRTEC) 10 MG tablet Take 10 mg by mouth daily   Yes Historical Provider, MD   meloxicam (MOBIC) 15 MG tablet Take 1 tablet by mouth daily as needed for Pain  Patient not taking: No sig reported 9/27/22   Brenda Ace MD   L-Methylfolate-Algae (DEPLIN 15 PO) Take by mouth    Historical Provider, MD   Multiple Vitamins-Minerals (THERAPEUTIC MULTIVITAMIN-MINERALS) tablet Take 1 tablet by mouth daily  Patient not taking: Reported on 2/16/2023    Historical Provider, MD   Potassium 99 MG TABS Take by mouth daily  Patient not taking: No sig reported    Historical Provider, MD     REVIEW OF SYSTEMS  The following systems were reviewed and revealed the following in addition to any already discussed in the HPI:    Review of Systems   Constitutional:  Negative for fatigue and fever. HENT:  Positive for postnasal drip, rhinorrhea and tinnitus. Negative for congestion, ear pain and sneezing. Eyes:  Negative for pain and visual disturbance. Respiratory:  Negative for cough and shortness of breath. Cardiovascular:  Negative for chest pain. Gastrointestinal:  Negative for nausea and vomiting. Endocrine: Negative. Genitourinary: Negative. Musculoskeletal:  Negative for neck pain and neck stiffness. Skin:  Negative for rash. Neurological:  Negative for dizziness and headaches.       PHYSICAL EXAM  /84 (Site: Right Lower Arm, Position: Sitting, Cuff Size: Medium Adult)   Pulse 77   Temp 97.9 °F (36.6 °C) (Infrared)   Resp 16   Ht 5' 5\" (1.651 m)   Wt 140 lb (63.5 kg)   BMI 23.30 kg/m²     GENERAL: No Acute Distress, Alert and Oriented, no hoarseness  EYES: EOMI, Anti-icteric  NOSE: No epistaxis, nasal mucosa within normal limits, no purulent drainage, 3+ deviated septum to the left  EARS: Normal external canal appearance, EAC patent bilaterally, TMs intact bilaterally with no evidence of effusion  FACE: 1/6 House-Brackmann Scale, symmetric, sensation equal bilaterally  ORAL CAVITY: No masses or lesions palpated, uvula is midline, moist mucous membranes  NECK: Normal range of motion, no thyromegaly, trachea is midline, no lymphadenopathy, no neck masses, no crepitus  CHEST: Normal respiratory effort, no retractions, breathing comfortably  SKIN: No rashes, normal appearing skin, no evidence of skin lesions/tumors    RADIOLOGY  Summary of findings:    PROCEDURE    ASSESSMENT/PLAN  Orma Fitting is a very pleasant 66 y.o. male with     1. Seasonal allergic rhinitis due to pollen  Orma Fitting would like to try Astelin and Flonase at the same time to see if this will control his allergy symptoms during this upcoming allergy season. If it is not enough he will consider allergy shots.  - azelastine (ASTELIN) 0.1 % nasal spray; 1 spray by Nasal route 2 times daily Use in each nostril as directed  Dispense: 30 mL; Refill: 4  - fluticasone (FLONASE) 50 MCG/ACT nasal spray; 2 sprays by Each Nostril route in the morning and at bedtime  Dispense: 48 g; Refill: 4    2. Deviated nasal septum  Does have a deviated nasal septum which continue to watch    Follow up in 4-5 months    Medical Decision Making:   The following items were considered in medical decision making:  Independent review of images  Review / order clinical lab tests  Review / order radiology tests  Decision to obtain old records

## 2023-03-30 ENCOUNTER — OFFICE VISIT (OUTPATIENT)
Dept: ORTHOPEDIC SURGERY | Age: 58
End: 2023-03-30

## 2023-03-30 ENCOUNTER — TELEPHONE (OUTPATIENT)
Dept: ORTHOPEDIC SURGERY | Age: 58
End: 2023-03-30

## 2023-03-30 VITALS — HEIGHT: 65 IN | WEIGHT: 140 LBS | BODY MASS INDEX: 23.32 KG/M2

## 2023-03-30 DIAGNOSIS — M25.511 RIGHT SHOULDER PAIN, UNSPECIFIED CHRONICITY: ICD-10-CM

## 2023-03-30 DIAGNOSIS — M75.81 ROTATOR CUFF TENDONITIS, RIGHT: Primary | ICD-10-CM

## 2023-03-30 DIAGNOSIS — M75.21 RIGHT BICIPITAL TENOSYNOVITIS: ICD-10-CM

## 2023-03-30 PROBLEM — G40.89 OTHER SEIZURES (HCC): Status: ACTIVE | Noted: 2023-03-30

## 2023-03-30 PROBLEM — R56.9 UNSPECIFIED CONVULSIONS (HCC): Status: ACTIVE | Noted: 2023-03-30

## 2023-03-30 RX ORDER — MELOXICAM 15 MG/1
15 TABLET ORAL DAILY PRN
Qty: 30 TABLET | Refills: 0 | Status: SHIPPED | OUTPATIENT
Start: 2023-03-30

## 2023-03-30 NOTE — TELEPHONE ENCOUNTER
Prescription Refill     Medication Name:  ANTINFLAMMATORY     Pharmacy: [facility]   Address: [address], [facility], [address] [place] [address]  Phone: [phone]    Patient Contact Number:  [phone]

## 2023-03-30 NOTE — TELEPHONE ENCOUNTER
Called patient to let them know that their MRI has been authorized and that they can call and schedule scan at their convenience. Also told them that they can call and schedule a f/u with Dr. Serenity Paz once they have MRI scheduled, leaving at least 2-3 days for our office to receive their results.

## 2023-03-30 NOTE — PROGRESS NOTES
the patient's presenting subjective symptoms.  -I had a pleasant discussion with the patient today. I reviewed with him that currently his clinical examination correlates to the above listed still. While he got good relief from a previous diagnostic and therapeutic corticosteroid injection his symptoms have been recalcitrant. I reviewed with him at this time consideration for MRI to review bicipital tenosynovitis as well as any gross rotator cuff pathology as it is waking him up at night now.  -Continue activity modification to include low impact. -Mobic 15 mg p.o. daily as needed pain and OTC Tylenol per bottle as needed discomfort  -Continue previous physical therapy as discussed and reviewed with him  -Follow-up after MRI to review additional treatment options to include additional corticosteroid injections as diagnostic and therapeutic versus consideration for right shoulder surgical procedures.  -All questions answered to the patient's satisfaction and the patient expressed understanding and agreement with the above listed treatment plan  -Follow up in after MRI per the above  -Thank you for the clinical consultation and allowing me to participate in the patient's care. Electronically signed by Rayne Garcia MD on 3/30/23 at 11:43 AM AUDIE Garcia MD       Orthopaedic Surgery-Sports Medicine    Disclaimer: This note was dictated with voice recognition software. Though review and correction are routinely performed, please contact the office/medical records for any errors requiring correction.

## 2023-04-17 ENCOUNTER — HOSPITAL ENCOUNTER (OUTPATIENT)
Dept: PHYSICAL THERAPY | Age: 58
Setting detail: THERAPIES SERIES
Discharge: HOME | End: 2023-04-17

## 2023-04-17 NOTE — FLOWSHEET NOTE
[facility], [address]        Physical Therapy  Cancellation/No-show Note  Patient Name:  Wilver Baker  :  1965   Date:  2023  Cancelled visits to date: 1 Eval  No-shows to date: 0    For today's appointment patient:  [x]  Cancelled  []  Rescheduled appointment  []  No-show     Reason given by patient:  []  Patient ill  []  Conflicting appointment  []  No transportation    []  Conflict with work  []  No reason given  [x]  Other:  pt.cancelled appt. For today and per , is going to reschedule at [facility] at a later date.    Comments:      Electronically signed by:  Idalia Rosenthal, PT

## 2023-08-09 DIAGNOSIS — F41.9 ANXIETY AND DEPRESSION: ICD-10-CM

## 2023-08-09 DIAGNOSIS — F32.A ANXIETY AND DEPRESSION: ICD-10-CM

## 2023-08-09 RX ORDER — DULOXETIN HYDROCHLORIDE 60 MG/1
CAPSULE, DELAYED RELEASE ORAL
Qty: 90 CAPSULE | Refills: 1 | Status: SHIPPED | OUTPATIENT
Start: 2023-08-09

## 2023-08-09 RX ORDER — LEVOMEFOLATE CALCIUM 15 MG
TABLET ORAL
Qty: 90 TABLET | Refills: 1 | Status: SHIPPED | OUTPATIENT
Start: 2023-08-09

## 2023-08-09 NOTE — TELEPHONE ENCOUNTER
Refill Request     CONFIRM preferred pharmacy with the patient. If Mail Order Rx - Pend for 90 day refill. Last Seen: Last Seen Department: 8/16/2022  Last Seen by PCP: 8/16/2022    Last Written:   Elfolate-08/16/2022 90 tablet 1 refills   Cymbalta-09/19/2022 90 capsule 1 refills       If no future appointment scheduled:  Review the last OV with PCP and review information for follow-up visit,  Route STAFF MESSAGE with patient name to the [facility] for scheduling with the following information:            -  Timing of next visit           -  Visit type ie Physical, OV, etc           -  Diagnoses/Reason ie. COPD, HTN - Do not use MEDICATION, Follow-up or CHECK UP - Give reason for visit      Next Appointment:   Future Appointments   Date Time Provider [address] [place] [address]   8/23/2023 11:00 AM DO TARAH Dias [place] - DYD       Message sent to [address] [place] [address] to schedule appt with patient?   NO      Requested Prescriptions     Pending Prescriptions Disp Refills    L-Methylfolate (ELFOLATE) 15 MG TABS [Pharmacy Med Name: ELFOLATE 15MG TABS] 90 tablet 1     Sig: TAKE 1 TABLET BY MOUTH ONE TIME A DAY    DULoxetine (CYMBALTA) 60 MG extended release capsule [Pharmacy Med Name: DULOXETINE HCL 60MG CPEP] 90 capsule 1     Sig: TAKE 1 CAPSULE BY MOUTH ONE TIME A DAY

## 2023-08-22 ASSESSMENT — PATIENT HEALTH QUESTIONNAIRE - PHQ9
4. FEELING TIRED OR HAVING LITTLE ENERGY: 2
SUM OF ALL RESPONSES TO PHQ QUESTIONS 1-9: 4
SUM OF ALL RESPONSES TO PHQ QUESTIONS 1-9: 4
8. MOVING OR SPEAKING SO SLOWLY THAT OTHER PEOPLE COULD HAVE NOTICED. OR THE OPPOSITE - BEING SO FIDGETY OR RESTLESS THAT YOU HAVE BEEN MOVING AROUND A LOT MORE THAN USUAL: NOT AT ALL
6. FEELING BAD ABOUT YOURSELF - OR THAT YOU ARE A FAILURE OR HAVE LET YOURSELF OR YOUR FAMILY DOWN: 0
1. LITTLE INTEREST OR PLEASURE IN DOING THINGS: 0
SUM OF ALL RESPONSES TO PHQ QUESTIONS 1-9: 4
7. TROUBLE CONCENTRATING ON THINGS, SUCH AS READING THE NEWSPAPER OR WATCHING TELEVISION: NOT AT ALL
5. POOR APPETITE OR OVEREATING: NOT AT ALL
7. TROUBLE CONCENTRATING ON THINGS, SUCH AS READING THE NEWSPAPER OR WATCHING TELEVISION: 0
1. LITTLE INTEREST OR PLEASURE IN DOING THINGS: NOT AT ALL
9. THOUGHTS THAT YOU WOULD BE BETTER OFF DEAD, OR OF HURTING YOURSELF: NOT AT ALL
5. POOR APPETITE OR OVEREATING: 0
8. MOVING OR SPEAKING SO SLOWLY THAT OTHER PEOPLE COULD HAVE NOTICED. OR THE OPPOSITE, BEING SO FIGETY OR RESTLESS THAT YOU HAVE BEEN MOVING AROUND A LOT MORE THAN USUAL: 0
SUM OF ALL RESPONSES TO PHQ QUESTIONS 1-9: 4
3. TROUBLE FALLING OR STAYING ASLEEP: MORE THAN HALF THE DAYS
SUM OF ALL RESPONSES TO PHQ9 QUESTIONS 1 & 2: 0
2. FEELING DOWN, DEPRESSED OR HOPELESS: 0
10. IF YOU CHECKED OFF ANY PROBLEMS, HOW DIFFICULT HAVE THESE PROBLEMS MADE IT FOR YOU TO DO YOUR WORK, TAKE CARE OF THINGS AT HOME, OR GET ALONG WITH OTHER PEOPLE: SOMEWHAT DIFFICULT
SUM OF ALL RESPONSES TO PHQ QUESTIONS 1-9: 4
9. THOUGHTS THAT YOU WOULD BE BETTER OFF DEAD, OR OF HURTING YOURSELF: 0
3. TROUBLE FALLING OR STAYING ASLEEP: 2
2. FEELING DOWN, DEPRESSED OR HOPELESS: NOT AT ALL
10. IF YOU CHECKED OFF ANY PROBLEMS, HOW DIFFICULT HAVE THESE PROBLEMS MADE IT FOR YOU TO DO YOUR WORK, TAKE CARE OF THINGS AT HOME, OR GET ALONG WITH OTHER PEOPLE: 1
4. FEELING TIRED OR HAVING LITTLE ENERGY: MORE THAN HALF THE DAYS
6. FEELING BAD ABOUT YOURSELF - OR THAT YOU ARE A FAILURE OR HAVE LET YOURSELF OR YOUR FAMILY DOWN: NOT AT ALL

## 2023-08-23 ENCOUNTER — OFFICE VISIT (OUTPATIENT)
Dept: FAMILY MEDICINE CLINIC | Age: 58
End: 2023-08-23
Payer: COMMERCIAL

## 2023-08-23 VITALS
WEIGHT: 160 LBS | DIASTOLIC BLOOD PRESSURE: 72 MMHG | HEIGHT: 65 IN | BODY MASS INDEX: 26.66 KG/M2 | SYSTOLIC BLOOD PRESSURE: 114 MMHG | OXYGEN SATURATION: 97 % | HEART RATE: 84 BPM

## 2023-08-23 DIAGNOSIS — Z00.00 ENCOUNTER FOR ANNUAL PHYSICAL EXAM: ICD-10-CM

## 2023-08-23 DIAGNOSIS — F41.9 ANXIETY AND DEPRESSION: ICD-10-CM

## 2023-08-23 DIAGNOSIS — K21.9 GASTROESOPHAGEAL REFLUX DISEASE WITHOUT ESOPHAGITIS: ICD-10-CM

## 2023-08-23 DIAGNOSIS — Z00.00 ENCOUNTER FOR ANNUAL PHYSICAL EXAM: Primary | ICD-10-CM

## 2023-08-23 DIAGNOSIS — M75.21 BICEPS TENDONITIS ON RIGHT: ICD-10-CM

## 2023-08-23 DIAGNOSIS — F32.A ANXIETY AND DEPRESSION: ICD-10-CM

## 2023-08-23 DIAGNOSIS — E78.2 MIXED HYPERLIPIDEMIA: ICD-10-CM

## 2023-08-23 DIAGNOSIS — M75.81 TENDINITIS OF RIGHT ROTATOR CUFF: ICD-10-CM

## 2023-08-23 PROBLEM — R56.9 UNSPECIFIED CONVULSIONS (HCC): Status: RESOLVED | Noted: 2023-03-30 | Resolved: 2023-08-23

## 2023-08-23 PROBLEM — G40.89 OTHER SEIZURES (HCC): Status: RESOLVED | Noted: 2023-03-30 | Resolved: 2023-08-23

## 2023-08-23 LAB
ALBUMIN SERPL-MCNC: 4.4 G/DL (ref 3.4–5)
ALBUMIN/GLOB SERPL: 2.1 {RATIO} (ref 1.1–2.2)
ALP SERPL-CCNC: 53 U/L (ref 40–129)
ALT SERPL-CCNC: 19 U/L (ref 10–40)
ANION GAP SERPL CALCULATED.3IONS-SCNC: 13 MMOL/L (ref 3–16)
AST SERPL-CCNC: 20 U/L (ref 15–37)
BILIRUB SERPL-MCNC: 0.4 MG/DL (ref 0–1)
BUN SERPL-MCNC: 22 MG/DL (ref 7–20)
CALCIUM SERPL-MCNC: 9.8 MG/DL (ref 8.3–10.6)
CHLORIDE SERPL-SCNC: 107 MMOL/L (ref 99–110)
CHOLEST SERPL-MCNC: 208 MG/DL (ref 0–199)
CO2 SERPL-SCNC: 22 MMOL/L (ref 21–32)
CREAT SERPL-MCNC: 1 MG/DL (ref 0.9–1.3)
GFR SERPLBLD CREATININE-BSD FMLA CKD-EPI: >60 ML/MIN/{1.73_M2}
GLUCOSE SERPL-MCNC: 98 MG/DL (ref 70–99)
HDLC SERPL-MCNC: 52 MG/DL (ref 40–60)
LDLC SERPL CALC-MCNC: 137 MG/DL
POTASSIUM SERPL-SCNC: 4.1 MMOL/L (ref 3.5–5.1)
PROT SERPL-MCNC: 6.5 G/DL (ref 6.4–8.2)
SODIUM SERPL-SCNC: 142 MMOL/L (ref 136–145)
TRIGL SERPL-MCNC: 96 MG/DL (ref 0–150)
VLDLC SERPL CALC-MCNC: 19 MG/DL

## 2023-08-23 PROCEDURE — 99214 OFFICE O/P EST MOD 30 MIN: CPT | Performed by: STUDENT IN AN ORGANIZED HEALTH CARE EDUCATION/TRAINING PROGRAM

## 2023-08-23 PROCEDURE — 99396 PREV VISIT EST AGE 40-64: CPT | Performed by: STUDENT IN AN ORGANIZED HEALTH CARE EDUCATION/TRAINING PROGRAM

## 2023-08-23 RX ORDER — MELOXICAM 15 MG/1
15 TABLET ORAL DAILY PRN
Qty: 30 TABLET | Refills: 0 | Status: SHIPPED | OUTPATIENT
Start: 2023-08-23

## 2023-08-23 RX ORDER — OMEPRAZOLE 40 MG/1
40 CAPSULE, DELAYED RELEASE ORAL
Qty: 30 CAPSULE | Refills: 0 | Status: SHIPPED | OUTPATIENT
Start: 2023-08-23

## 2023-08-23 ASSESSMENT — ANXIETY QUESTIONNAIRES
1. FEELING NERVOUS, ANXIOUS, OR ON EDGE: 0
4. TROUBLE RELAXING: 0
5. BEING SO RESTLESS THAT IT IS HARD TO SIT STILL: 1
7. FEELING AFRAID AS IF SOMETHING AWFUL MIGHT HAPPEN: 0
3. WORRYING TOO MUCH ABOUT DIFFERENT THINGS: 0
6. BECOMING EASILY ANNOYED OR IRRITABLE: 0
IF YOU CHECKED OFF ANY PROBLEMS ON THIS QUESTIONNAIRE, HOW DIFFICULT HAVE THESE PROBLEMS MADE IT FOR YOU TO DO YOUR WORK, TAKE CARE OF THINGS AT HOME, OR GET ALONG WITH OTHER PEOPLE: NOT DIFFICULT AT ALL
2. NOT BEING ABLE TO STOP OR CONTROL WORRYING: 0
GAD7 TOTAL SCORE: 1

## 2023-08-23 NOTE — PROGRESS NOTES
Patient: Halina Ackerman is a 66 y.o. male who presents today with the following Chief Complaint(s):  Chief Complaint   Patient presents with    Annual Exam    Heartburn         HPI    Reports continued pain in right shoulder  Controlled with mobic  Has followed with dr. Haja Pritchard. Does not wish to have surgery at this time  Doing home rubberband exercise    Anxiety  Stopped lamotrigene on his own 2 months ago  Does not notice any change  Cymbalta 60 mg currently    Low back pain  Improved with cymbalta    Colonoscopy: due this year    Current Outpatient Medications   Medication Sig Dispense Refill    omeprazole (PRILOSEC) 40 MG delayed release capsule Take 1 capsule by mouth every morning (before breakfast) 30 capsule 0    meloxicam (MOBIC) 15 MG tablet Take 1 tablet by mouth daily as needed for Pain 30 tablet 0    L-Methylfolate (ELFOLATE) 15 MG TABS TAKE 1 TABLET BY MOUTH ONE TIME A DAY 90 tablet 1    DULoxetine (CYMBALTA) 60 MG extended release capsule TAKE 1 CAPSULE BY MOUTH ONE TIME A DAY 90 capsule 1    azelastine (ASTELIN) 0.1 % nasal spray 1 spray by Nasal route 2 times daily Use in each nostril as directed 30 mL 4    fluticasone (FLONASE) 50 MCG/ACT nasal spray 2 sprays by Each Nostril route in the morning and at bedtime 48 g 4    econazole nitrate 1 % cream Apply topically daily Apply topically daily.       Hydrocortisone Butyrate 0.1 % CREA Apply topically daily       naphazoline-pheniramine (NAPHCON-A) 0.025-0.3 % ophthalmic solution Place 1 drop into both eyes as needed      cetirizine (ZYRTEC) 10 MG tablet Take 1 tablet by mouth daily      L-Methylfolate-Algae (DEPLIN 15 PO) Take by mouth (Patient not taking: Reported on 8/23/2023)      Multiple Vitamins-Minerals (THERAPEUTIC MULTIVITAMIN-MINERALS) tablet Take 1 tablet by mouth daily (Patient not taking: Reported on 2/16/2023)      Potassium 99 MG TABS Take by mouth daily (Patient not taking: Reported on 1/6/2023)       No current facility-administered

## 2023-10-03 ENCOUNTER — TELEPHONE (OUTPATIENT)
Dept: ORTHOPEDIC SURGERY | Age: 58
End: 2023-10-03

## 2023-10-04 ENCOUNTER — TELEPHONE (OUTPATIENT)
Dept: ORTHOPEDIC SURGERY | Age: 58
End: 2023-10-04

## 2023-10-05 ENCOUNTER — TELEPHONE (OUTPATIENT)
Dept: ORTHOPEDIC SURGERY | Age: 58
End: 2023-10-05

## 2023-10-05 ENCOUNTER — OFFICE VISIT (OUTPATIENT)
Dept: ORTHOPEDIC SURGERY | Age: 58
End: 2023-10-05

## 2023-10-05 VITALS — WEIGHT: 160 LBS | HEIGHT: 65 IN | BODY MASS INDEX: 26.66 KG/M2

## 2023-10-05 DIAGNOSIS — S46.011D TRAUMATIC INCOMPLETE TEAR OF RIGHT ROTATOR CUFF, SUBSEQUENT ENCOUNTER: ICD-10-CM

## 2023-10-05 DIAGNOSIS — M75.21 RIGHT BICIPITAL TENOSYNOVITIS: Primary | ICD-10-CM

## 2023-10-05 DIAGNOSIS — M75.51 SUBACROMIAL BURSITIS OF RIGHT SHOULDER JOINT: ICD-10-CM

## 2023-10-05 NOTE — PROGRESS NOTES
dictated with voice recognition software. Though review and correction are routinely performed, please contact the office/medical records for any errors requiring correction.

## 2023-10-11 ENCOUNTER — TELEPHONE (OUTPATIENT)
Dept: ORTHOPEDIC SURGERY | Age: 58
End: 2023-10-11

## 2023-10-11 NOTE — TELEPHONE ENCOUNTER
Auth: NPR  Date: 10/18/23  Type of SX: OUTPATIENT  Location: [facility]  CPT: 04843, 84854, [id number]   DX: S46.011D, M75.51  SX area: RIGHT SHOULDER  Insurance: [facility] BS/[facility]

## 2023-10-11 NOTE — PROGRESS NOTES
Aisha Caballero    Age 66 y.o.    male    1965    MRN [id number]    10/18/2023  Arrival Time_____________  OR Time____________[address]     Procedure(s):  VIDEO ARTHROSCOPY RIGHT SHOULDER,  SUBACROMIAL DECOMPRESSION,POSSIBLE ROTATOR CUFF REPAIR, OPEN SUBPECTORAL BICEPS TENODESIS NOTE: INTERSCALENE SINGLE SHOT BLOCK                      General    Surgeon(s):  Meir Bird MD       Phone [phone] ([place])     [facility]         Work  _____________________________________________________________________  _____________________________________________________________________  _____________________________________________________________________  _____________________________________________________________________  _____________________________________________________________________    PCP _____________________________ Phone_________________     H&P__________________Bringing      Chart            Epic   DOS      Called________  EKG__________________Bringing      Chart            Epic   DOS      Called________  LAB__________________ Bringing      Chart            Epic   DOS      Called________  Cardiac Clearance_______Bringing      Chart            Epic      DOS      Called________    Cardiologist________________________ Phone___________________________    ? Jain concerns / Waiver on Chart            PAT Communications________________  ? Pre-op Instructions Given [address]          _________________________________  ? Directions to Surgery Center                          _________________________________  ? Transportation Home_______________      __________________________________  ?  Crutches/Walker__________________        __________________________________    ________Pre-op Orders   _______Transcribed    _______Wt.  ________Pharmacy          _______SCD  ______VTE     ______TED Emmer Elina  _______  Surgery Consent    _______  Anesthesia Consent         COVID

## 2023-10-12 NOTE — PROGRESS NOTES
Luís Wilkes    Age 66 y.o.    male    1965    MRN [id number]    10/18/2023  Arrival Time_____________  OR Time____________[address]     Procedure(s):  VIDEO ARTHROSCOPY RIGHT SHOULDER,  SUBACROMIAL DECOMPRESSION,POSSIBLE ROTATOR CUFF REPAIR, OPEN SUBPECTORAL BICEPS TENODESIS NOTE: INTERSCALENE SINGLE SHOT BLOCK                      General    Surgeon(s):  Sandro Levine MD       Phone [phone] ([place])     [facility]         Work  _____________________________________________________________________  _____________________________________________________________________  _____________________________________________________________________  _____________________________________________________________________  _____________________________________________________________________    PCP _____________________________ Phone_________________     H&P__________________Bringing      Chart            Epic   DOS      Called________  EKG__________________Bringing      Chart            Epic   DOS      Called________  LAB__________________ Bringing      Chart            Epic   DOS      Called________  Cardiac Clearance_______Bringing      Chart            Epic      DOS      Called________    Cardiologist________________________ Phone___________________________    ? Restoration concerns / Waiver on Chart            PAT Communications________________  ? Pre-op Instructions Given [address]          _________________________________  ? Directions to Surgery Center                          _________________________________  ? Transportation Home_______________      __________________________________  ?  Crutches/Walker__________________        __________________________________    ________Pre-op Orders   _______Transcribed    _______Wt.  ________Pharmacy          _______SCD  ______VTE     ______TED Scarlett José  _______  Surgery Consent    _______  Anesthesia Consent         COVID

## 2023-10-12 NOTE — PROGRESS NOTES
Date and time of surgery :   10/18/23 at 36           Arrival Time:  600     Bring Picture ID and insurance card. Please wear simple, loose fitting clothing to the hospital. Bring a shirt that is at least 2 sizes too big to wear home. Bring sling day of surgery  Do not bring valuables (money, credit cards, checkbooks, etc.)   DO NOT wear any jewelry or piercings on day of surgery. All body piercing jewelry must be removed. If you have dentures, they will be removed before going to the OR; we will provide you a container. If you wear contact lenses or glasses, they will be removed; please bring a case for them. Shower the evening before or morning of surgery with antibacterial soap. Nothing to eat or drink after midnight the day before surgery. You may brush your teeth and gargle the morning of surgery. DO NOT SWALLOW WATER. Do not take any morning meds the day of your surgery. Aspirin, Ibuprofen, Advil, Naproxen, Vitamin E and other Anti-inflammatory products and supplements should be stopped for 5 -7days before surgery or as directed by your physician. Do not smoke or drink any alcoholic beverages 24 hours prior to surgery. This includes NA Beer. Refrain from the usage of any recreational drugs, including non-prescribed prescription drugs. You MUST plan for a responsible adult to stay on site while you are here and take you home after your surgery. You will not be allowed to leave alone or drive yourself home. It is strongly suggested someone stay with you the first 24 hrs. Your surgery will be cancelled if you do not have a ride home. To help prevent infection, change your sheets the night before surgery. If you  have a Living Will and Durable Power of  for Healthcare, please bring in a copy. Notify your Surgeon if you develop any illness between now and time of surgery.  Cough, cold, fever, sore throat, nausea, vomiting, etc.  Please notify your surgeon if you experience dizziness,

## 2023-10-16 ENCOUNTER — TELEPHONE (OUTPATIENT)
Dept: FAMILY MEDICINE CLINIC | Age: 58
End: 2023-10-16

## 2023-10-16 DIAGNOSIS — M25.511 RIGHT SHOULDER PAIN, UNSPECIFIED CHRONICITY: Primary | ICD-10-CM

## 2023-10-16 NOTE — TELEPHONE ENCOUNTER
Surgery 10/18/23  Right Shoulder Scope    His pre op is more then 27days old, did you need to see him for a pre op? Apparently a form was dropped off and you told him you would update it.  They are ok if you just fill out the form with todayds date     Please call Monica back at [phone]

## 2023-10-18 ENCOUNTER — ANESTHESIA EVENT (OUTPATIENT)
Dept: OPERATING ROOM | Age: 58
End: 2023-10-18
Payer: COMMERCIAL

## 2023-10-18 ENCOUNTER — HOSPITAL ENCOUNTER (OUTPATIENT)
Age: 58
Setting detail: OUTPATIENT SURGERY
Discharge: HOME | End: 2023-10-18
Attending: ORTHOPAEDIC SURGERY | Admitting: ORTHOPAEDIC SURGERY
Payer: COMMERCIAL

## 2023-10-18 ENCOUNTER — ANESTHESIA (OUTPATIENT)
Dept: OPERATING ROOM | Age: 58
End: 2023-10-18
Payer: COMMERCIAL

## 2023-10-18 VITALS
BODY MASS INDEX: 24.16 KG/M2 | HEIGHT: 65 IN | TEMPERATURE: 97.4 F | SYSTOLIC BLOOD PRESSURE: 115 MMHG | WEIGHT: 145 LBS | RESPIRATION RATE: 16 BRPM | DIASTOLIC BLOOD PRESSURE: 75 MMHG | HEART RATE: 70 BPM | OXYGEN SATURATION: 97 %

## 2023-10-18 DIAGNOSIS — Z47.89 ORTHOPEDIC AFTERCARE: Primary | ICD-10-CM

## 2023-10-18 PROCEDURE — 2500000003 HC RX 250 WO HCPCS: Performed by: NURSE ANESTHETIST, CERTIFIED REGISTERED

## 2023-10-18 PROCEDURE — 6360000002 HC RX W HCPCS: Performed by: NURSE ANESTHETIST, CERTIFIED REGISTERED

## 2023-10-18 PROCEDURE — 2709999900 HC NON-CHARGEABLE SUPPLY: Performed by: ORTHOPAEDIC SURGERY

## 2023-10-18 PROCEDURE — C1776 JOINT DEVICE (IMPLANTABLE): HCPCS | Performed by: ORTHOPAEDIC SURGERY

## 2023-10-18 PROCEDURE — 6360000002 HC RX W HCPCS: Performed by: ORTHOPAEDIC SURGERY

## 2023-10-18 PROCEDURE — 3700000000 HC ANESTHESIA ATTENDED CARE: Performed by: ORTHOPAEDIC SURGERY

## 2023-10-18 PROCEDURE — 3600000005 HC SURGERY LEVEL 5 BASE: Performed by: ORTHOPAEDIC SURGERY

## 2023-10-18 PROCEDURE — 64415 NJX AA&/STRD BRCH PLXS IMG: CPT | Performed by: ANESTHESIOLOGY

## 2023-10-18 PROCEDURE — 2500000003 HC RX 250 WO HCPCS: Performed by: ORTHOPAEDIC SURGERY

## 2023-10-18 PROCEDURE — 7100000000 HC PACU RECOVERY - FIRST 15 MIN: Performed by: ORTHOPAEDIC SURGERY

## 2023-10-18 PROCEDURE — 7100000011 HC PHASE II RECOVERY - ADDTL 15 MIN: Performed by: ORTHOPAEDIC SURGERY

## 2023-10-18 PROCEDURE — C1713 ANCHOR/SCREW BN/BN,TIS/BN: HCPCS | Performed by: ORTHOPAEDIC SURGERY

## 2023-10-18 PROCEDURE — 7100000010 HC PHASE II RECOVERY - FIRST 15 MIN: Performed by: ORTHOPAEDIC SURGERY

## 2023-10-18 PROCEDURE — 3700000001 HC ADD 15 MINUTES (ANESTHESIA): Performed by: ORTHOPAEDIC SURGERY

## 2023-10-18 PROCEDURE — 2720000010 HC SURG SUPPLY STERILE: Performed by: ORTHOPAEDIC SURGERY

## 2023-10-18 PROCEDURE — 3600000015 HC SURGERY LEVEL 5 ADDTL 15MIN: Performed by: ORTHOPAEDIC SURGERY

## 2023-10-18 PROCEDURE — 2580000003 HC RX 258: Performed by: ANESTHESIOLOGY

## 2023-10-18 PROCEDURE — 7100000001 HC PACU RECOVERY - ADDTL 15 MIN: Performed by: ORTHOPAEDIC SURGERY

## 2023-10-18 PROCEDURE — 6360000002 HC RX W HCPCS: Performed by: ANESTHESIOLOGY

## 2023-10-18 DEVICE — ANCHOR SUT DIA2.6MM 3 LD W/ 3 1.3MM WHT/BLUE WHT/BLACK AND: Type: IMPLANTABLE DEVICE | Site: SHOULDER | Status: FUNCTIONAL

## 2023-10-18 DEVICE — ANCHOR SUT L19.1MM DIA5.5MM BIOCOMPOSITE FULL THRD KNOTLESS: Type: IMPLANTABLE DEVICE | Site: SHOULDER | Status: FUNCTIONAL

## 2023-10-18 DEVICE — KIT IMPL SYS PROX TENODESIS W/ BICEPSBUTTON INSRT FIBERLOOP: Type: IMPLANTABLE DEVICE | Site: SHOULDER | Status: FUNCTIONAL

## 2023-10-18 RX ORDER — APREPITANT 40 MG/1
40 CAPSULE ORAL ONCE
Status: DISCONTINUED | OUTPATIENT
Start: 2023-10-18 | End: 2023-10-18 | Stop reason: HOSPADM

## 2023-10-18 RX ORDER — EPHEDRINE SULFATE 50 MG/ML
INJECTION INTRAVENOUS PRN
Status: DISCONTINUED | OUTPATIENT
Start: 2023-10-18 | End: 2023-10-18 | Stop reason: SDUPTHER

## 2023-10-18 RX ORDER — OXYCODONE HYDROCHLORIDE 5 MG/1
5 TABLET ORAL PRN
Status: DISCONTINUED | OUTPATIENT
Start: 2023-10-18 | End: 2023-10-18 | Stop reason: HOSPADM

## 2023-10-18 RX ORDER — PHENYLEPHRINE HCL IN 0.9% NACL 1 MG/10 ML
SYRINGE (ML) INTRAVENOUS PRN
Status: DISCONTINUED | OUTPATIENT
Start: 2023-10-18 | End: 2023-10-18 | Stop reason: SDUPTHER

## 2023-10-18 RX ORDER — OXYCODONE HYDROCHLORIDE 5 MG/1
10 TABLET ORAL PRN
Status: DISCONTINUED | OUTPATIENT
Start: 2023-10-18 | End: 2023-10-18 | Stop reason: HOSPADM

## 2023-10-18 RX ORDER — MIDAZOLAM HYDROCHLORIDE 1 MG/ML
INJECTION INTRAMUSCULAR; INTRAVENOUS
Status: COMPLETED
Start: 2023-10-18 | End: 2023-10-18

## 2023-10-18 RX ORDER — SODIUM CHLORIDE 0.9 % (FLUSH) 0.9 %
5-40 SYRINGE (ML) INJECTION PRN
Status: DISCONTINUED | OUTPATIENT
Start: 2023-10-18 | End: 2023-10-18 | Stop reason: HOSPADM

## 2023-10-18 RX ORDER — BUPIVACAINE HYDROCHLORIDE 2.5 MG/ML
INJECTION, SOLUTION EPIDURAL; INFILTRATION; INTRACAUDAL
Status: COMPLETED | OUTPATIENT
Start: 2023-10-18 | End: 2023-10-18

## 2023-10-18 RX ORDER — PROPOFOL 10 MG/ML
INJECTION, EMULSION INTRAVENOUS PRN
Status: DISCONTINUED | OUTPATIENT
Start: 2023-10-18 | End: 2023-10-18 | Stop reason: SDUPTHER

## 2023-10-18 RX ORDER — MIDAZOLAM HYDROCHLORIDE 1 MG/ML
INJECTION INTRAMUSCULAR; INTRAVENOUS PRN
Status: DISCONTINUED | OUTPATIENT
Start: 2023-10-18 | End: 2023-10-18 | Stop reason: SDUPTHER

## 2023-10-18 RX ORDER — LABETALOL HYDROCHLORIDE 5 MG/ML
5 INJECTION, SOLUTION INTRAVENOUS EVERY 10 MIN PRN
Status: DISCONTINUED | OUTPATIENT
Start: 2023-10-18 | End: 2023-10-18 | Stop reason: HOSPADM

## 2023-10-18 RX ORDER — TRANEXAMIC ACID 100 MG/ML
1000 INJECTION, SOLUTION INTRAVENOUS ONCE
Status: COMPLETED | OUTPATIENT
Start: 2023-10-18 | End: 2023-10-18

## 2023-10-18 RX ORDER — SODIUM CHLORIDE 0.9 % (FLUSH) 0.9 %
5-40 SYRINGE (ML) INJECTION EVERY 12 HOURS SCHEDULED
Status: DISCONTINUED | OUTPATIENT
Start: 2023-10-18 | End: 2023-10-18 | Stop reason: HOSPADM

## 2023-10-18 RX ORDER — TRANEXAMIC ACID 100 MG/ML
1000 INJECTION, SOLUTION INTRAVENOUS ONCE
Status: DISCONTINUED | OUTPATIENT
Start: 2023-10-18 | End: 2023-10-18 | Stop reason: HOSPADM

## 2023-10-18 RX ORDER — DEXAMETHASONE SODIUM PHOSPHATE 10 MG/ML
INJECTION INTRAMUSCULAR; INTRAVENOUS PRN
Status: DISCONTINUED | OUTPATIENT
Start: 2023-10-18 | End: 2023-10-18 | Stop reason: SDUPTHER

## 2023-10-18 RX ORDER — CEFAZOLIN SODIUM IN 0.9 % NACL 2 G/100 ML
2000 PLASTIC BAG, INJECTION (ML) INTRAVENOUS
Status: COMPLETED | OUTPATIENT
Start: 2023-10-18 | End: 2023-10-18

## 2023-10-18 RX ORDER — DIPHENHYDRAMINE HYDROCHLORIDE 50 MG/ML
12.5 INJECTION INTRAMUSCULAR; INTRAVENOUS
Status: DISCONTINUED | OUTPATIENT
Start: 2023-10-18 | End: 2023-10-18 | Stop reason: HOSPADM

## 2023-10-18 RX ORDER — LIDOCAINE HYDROCHLORIDE 10 MG/ML
0.3 INJECTION, SOLUTION EPIDURAL; INFILTRATION; INTRACAUDAL; PERINEURAL
Status: DISCONTINUED | OUTPATIENT
Start: 2023-10-18 | End: 2023-10-18 | Stop reason: HOSPADM

## 2023-10-18 RX ORDER — SODIUM CHLORIDE 9 MG/ML
INJECTION, SOLUTION INTRAVENOUS PRN
Status: DISCONTINUED | OUTPATIENT
Start: 2023-10-18 | End: 2023-10-18 | Stop reason: HOSPADM

## 2023-10-18 RX ORDER — ONDANSETRON 2 MG/ML
4 INJECTION INTRAMUSCULAR; INTRAVENOUS
Status: DISCONTINUED | OUTPATIENT
Start: 2023-10-18 | End: 2023-10-18 | Stop reason: HOSPADM

## 2023-10-18 RX ORDER — FENTANYL CITRATE 50 UG/ML
INJECTION, SOLUTION INTRAMUSCULAR; INTRAVENOUS PRN
Status: DISCONTINUED | OUTPATIENT
Start: 2023-10-18 | End: 2023-10-18 | Stop reason: SDUPTHER

## 2023-10-18 RX ORDER — MEPERIDINE HYDROCHLORIDE 50 MG/ML
12.5 INJECTION INTRAMUSCULAR; INTRAVENOUS; SUBCUTANEOUS EVERY 5 MIN PRN
Status: DISCONTINUED | OUTPATIENT
Start: 2023-10-18 | End: 2023-10-18 | Stop reason: HOSPADM

## 2023-10-18 RX ORDER — SODIUM CHLORIDE, SODIUM LACTATE, POTASSIUM CHLORIDE, CALCIUM CHLORIDE 600; 310; 30; 20 MG/100ML; MG/100ML; MG/100ML; MG/100ML
INJECTION, SOLUTION INTRAVENOUS CONTINUOUS
Status: DISCONTINUED | OUTPATIENT
Start: 2023-10-18 | End: 2023-10-18 | Stop reason: HOSPADM

## 2023-10-18 RX ORDER — BUPIVACAINE HYDROCHLORIDE 2.5 MG/ML
INJECTION, SOLUTION INFILTRATION; PERINEURAL PRN
Status: DISCONTINUED | OUTPATIENT
Start: 2023-10-18 | End: 2023-10-18 | Stop reason: ALTCHOICE

## 2023-10-18 RX ORDER — SUCCINYLCHOLINE CHLORIDE 20 MG/ML
INJECTION INTRAMUSCULAR; INTRAVENOUS PRN
Status: DISCONTINUED | OUTPATIENT
Start: 2023-10-18 | End: 2023-10-18 | Stop reason: SDUPTHER

## 2023-10-18 RX ORDER — ONDANSETRON 2 MG/ML
INJECTION INTRAMUSCULAR; INTRAVENOUS PRN
Status: DISCONTINUED | OUTPATIENT
Start: 2023-10-18 | End: 2023-10-18 | Stop reason: SDUPTHER

## 2023-10-18 RX ORDER — LIDOCAINE HYDROCHLORIDE 20 MG/ML
INJECTION, SOLUTION INFILTRATION; PERINEURAL PRN
Status: DISCONTINUED | OUTPATIENT
Start: 2023-10-18 | End: 2023-10-18 | Stop reason: SDUPTHER

## 2023-10-18 RX ORDER — ROCURONIUM BROMIDE 10 MG/ML
INJECTION, SOLUTION INTRAVENOUS PRN
Status: DISCONTINUED | OUTPATIENT
Start: 2023-10-18 | End: 2023-10-18 | Stop reason: SDUPTHER

## 2023-10-18 RX ORDER — OXYCODONE HYDROCHLORIDE 5 MG/1
5 TABLET ORAL EVERY 6 HOURS PRN
Qty: 20 TABLET | Refills: 0 | Status: SHIPPED | OUTPATIENT
Start: 2023-10-18 | End: 2023-10-23

## 2023-10-18 RX ADMIN — Medication 2000 MG: at 07:27

## 2023-10-18 RX ADMIN — Medication 50 MCG: at 08:37

## 2023-10-18 RX ADMIN — Medication 50 MCG: at 08:15

## 2023-10-18 RX ADMIN — Medication 50 MCG: at 08:03

## 2023-10-18 RX ADMIN — SUGAMMADEX 200 MG: 100 INJECTION, SOLUTION INTRAVENOUS at 09:13

## 2023-10-18 RX ADMIN — Medication 50 MCG: at 08:07

## 2023-10-18 RX ADMIN — ROCURONIUM BROMIDE 40 MG: 50 INJECTION, SOLUTION INTRAVENOUS at 07:40

## 2023-10-18 RX ADMIN — BUPIVACAINE HYDROCHLORIDE 20 ML: 2.5 INJECTION, SOLUTION EPIDURAL; INFILTRATION; INTRACAUDAL; PERINEURAL at 06:50

## 2023-10-18 RX ADMIN — SUCCINYLCHOLINE CHLORIDE 100 MG: 20 INJECTION, SOLUTION INTRAMUSCULAR; INTRAVENOUS at 07:32

## 2023-10-18 RX ADMIN — FENTANYL CITRATE 50 MCG: 50 INJECTION, SOLUTION INTRAMUSCULAR; INTRAVENOUS at 07:46

## 2023-10-18 RX ADMIN — Medication 50 MCG: at 08:26

## 2023-10-18 RX ADMIN — LIDOCAINE HYDROCHLORIDE 60 MG: 20 INJECTION, SOLUTION INFILTRATION; PERINEURAL at 07:31

## 2023-10-18 RX ADMIN — Medication 50 MCG: at 07:55

## 2023-10-18 RX ADMIN — SODIUM CHLORIDE, SODIUM LACTATE, POTASSIUM CHLORIDE, AND CALCIUM CHLORIDE: .6; .31; .03; .02 INJECTION, SOLUTION INTRAVENOUS at 07:24

## 2023-10-18 RX ADMIN — Medication 50 MCG: at 08:22

## 2023-10-18 RX ADMIN — TRANEXAMIC ACID 1000 MG: 100 INJECTION, SOLUTION INTRAVENOUS at 07:38

## 2023-10-18 RX ADMIN — Medication 50 MCG: at 08:10

## 2023-10-18 RX ADMIN — Medication 50 MCG: at 08:12

## 2023-10-18 RX ADMIN — MIDAZOLAM 2 MG: 1 INJECTION INTRAMUSCULAR; INTRAVENOUS at 06:50

## 2023-10-18 RX ADMIN — EPHEDRINE SULFATE 10 MG: 50 INJECTION INTRAVENOUS at 08:54

## 2023-10-18 RX ADMIN — Medication 50 MCG: at 08:17

## 2023-10-18 RX ADMIN — DEXAMETHASONE SODIUM PHOSPHATE 10 MG: 10 INJECTION INTRAMUSCULAR; INTRAVENOUS at 07:49

## 2023-10-18 RX ADMIN — Medication 50 MCG: at 08:40

## 2023-10-18 RX ADMIN — Medication 50 MCG: at 08:33

## 2023-10-18 RX ADMIN — FENTANYL CITRATE 50 MCG: 50 INJECTION, SOLUTION INTRAMUSCULAR; INTRAVENOUS at 07:49

## 2023-10-18 RX ADMIN — PROPOFOL 160 MG: 10 INJECTION, EMULSION INTRAVENOUS at 07:31

## 2023-10-18 RX ADMIN — Medication 50 MCG: at 07:57

## 2023-10-18 RX ADMIN — PROPOFOL 40 MG: 10 INJECTION, EMULSION INTRAVENOUS at 07:49

## 2023-10-18 RX ADMIN — ONDANSETRON 4 MG: 2 INJECTION INTRAMUSCULAR; INTRAVENOUS at 07:49

## 2023-10-18 RX ADMIN — Medication 50 MCG: at 08:30

## 2023-10-18 RX ADMIN — ROCURONIUM BROMIDE 10 MG: 50 INJECTION, SOLUTION INTRAVENOUS at 07:31

## 2023-10-18 RX ADMIN — Medication 50 MCG: at 08:45

## 2023-10-18 ASSESSMENT — PAIN SCALES - GENERAL: PAINLEVEL_OUTOF10: 0

## 2023-10-18 ASSESSMENT — PAIN - FUNCTIONAL ASSESSMENT: PAIN_FUNCTIONAL_ASSESSMENT: 0-10

## 2023-10-18 NOTE — PROGRESS NOTES
Time out performed with patient, Dr. Eder Cordova, and Nolvia Chin RN. Video Arthroscopy Right Shoulder, Subacromial Decompression, Possible Rotator Cuff Repair, Open Subpectoral Biceps Tenodesis. Note Interscalene Single Shot Block.

## 2023-10-18 NOTE — H&P
ORTHOPAEDIC SURGERY INTERVAL H&P    April Barrios was seen in the preoperative area, where a history and physical examination was reviewed and the patient was examined by me today. There have been no significant clinical changes since the completion of the previous recorded history and physical as well as recent progress notes dated from Sept 2022 through Oct 2023 and PCP note dated 10/16/23. The surgical site was confirmed by the patient and me and the surgical site was marked. MRI Read update give updated transcription  Addendum to note Apr 2023 at MRI review:  MY READ: high-grade tendinosis of the supraspinatus and infraspinatus without full thickness tear although anterior aspect with suspected partial tear. Articular and bursal sided subscap intact although with split longitudinal tear. However bicipital tendon is still in the groove without gross subluxation. Positive degeneration/type I SLAP tear. Positive subacromial edema. Positive AC joint edema/arthrosis  End addendum    The risks, benefits, and alternatives of the proposed procedure(s) have been explained to the patient (or appropriately confirmed guardian) and understanding was verbalized. Please see outpatient notes for details. All questions were answered and a signed documented consent has been placed in the patient's chart. The patient wishes to proceed. On call to the OR. Electronically signed by: Jefe Navarrete MD,10/18/2023,7:23 AM         Jefe Navarrete MD       Orthopaedic Surgery-Sports Medicine      Disclaimer: This note was dictated with voice recognition software. Though review and correction are routinely performed, please contact the office/medical records for any errors requiring correction.

## 2023-10-18 NOTE — OP NOTE
Operative Note      Patient: Isidra Patterson  YOB: 1965  MRN: [id number]    Date of Procedure: 10/18/2023    Pre-Op Diagnosis Codes:     * Right bicipital tenosynovitis [M75.21]     * Traumatic incomplete tear of right rotator cuff, subsequent encounter [S46.011D]     * Bursitis of right shoulder [M75.51]    Post-Op Diagnosis: Full-thickness right rotator cuff tear, subacromial bursitis right shoulder, bicipital tenosynovitis right shoulder       Procedure(s):  VIDEO ARTHROSCOPY RIGHT SHOULDER,  SUBACROMIAL DECOMPRESSION,ROTATOR CUFF REPAIR, OPEN SUBPECTORAL BICEPS TENODESIS     Surgeon(s):  Katia Rasmussen MD    Assistant:   Surgical Assistant: Marlon Lira    Anesthesia: General    Estimated Blood Loss (mL): Minimal    Complications: None    Specimens:   * No specimens in log *    Implants:  Implant Name Type Inv. Item Serial No.  Lot No. LRB No. Used Action   KIT IMPL SYS PROX TENODESIS W/ Isabella Ramsey - [id number]  KIT IMPL SYS PROX TENODESIS W/ [employer] [id number] Right 1 Implanted   ANCHOR SUT DIA2.6MM 3 LD W/ 3 1.3MM WHT/BLUE WHT/BLACK AND - [id number]  ANCHOR SUT DIA2.6MM 3 LD W/ 3 1.3MM WHT/BLUE WHT/BLACK AND  [employer] [id number] Right 1 Implanted   ANCHOR SUT L19.1MM DIA5. 5MM BIOCOMPOSITE FULL THRD KNOTLESS - [id number]  ANCHOR SUT L19.1MM DIA5. 5MM BIOCOMPOSITE FULL THRD KNOTLESS  [employer] [id number] Right 1 Implanted         Drains: * No LDAs found *    Findings: Full-thickness anterior supraspinatus tear 10 mm anterior to posterior. Infraspinatus intact. Subscapularis intact. Positive degenerative type I SLAP tear with associated bicipital tenosynovitis.   Positive subacromial bursitis with anterior acromial spur    Detailed Description of Procedure:     OPERATIVE INDICATIONS: Patient is a  66 y.o. male with right shoulder pain and symptoms consistent with subacromial impingement, rotator cuff tendinitis/tear, and

## 2023-10-18 NOTE — ANESTHESIA POSTPROCEDURE EVALUATION
Department of Anesthesiology  Postprocedure Note    Patient: Shad Spann  MRN: [id number]  YOB: 1965  Date of evaluation: 10/18/2023      Procedure Summary     Date: 10/18/23 Room / Location: [address] / Keyla Borrego    Anesthesia Start: 6113 Anesthesia Stop: 8712    Procedure: VIDEO ARTHROSCOPY RIGHT SHOULDER,  SUBACROMIAL DECOMPRESSION,ROTATOR CUFF REPAIR, OPEN SUBPECTORAL BICEPS TENODESIS  (Right: Shoulder) Diagnosis:       Right bicipital tenosynovitis      Traumatic incomplete tear of right rotator cuff, subsequent encounter      Bursitis of right shoulder      (Right bicipital tenosynovitis [M75.21])      (Traumatic incomplete tear of right rotator cuff, subsequent encounter [S46.011D])      (Bursitis of right shoulder [M75.51])    Surgeons: Balbina Anderson MD Responsible Provider: Dorean Skiff, MD    Anesthesia Type: general ASA Status: 2          Anesthesia Type: No value filed.     Spencer Phase I: Spencer Score: 10    Spencer Phase II: Spencer Score: 10      Anesthesia Post Evaluation    Patient location during evaluation: PACU  Patient participation: complete - patient participated  Level of consciousness: awake and alert  Pain score: 0  Airway patency: patent  Nausea & Vomiting: no nausea and no vomiting  Complications: no  Cardiovascular status: blood pressure returned to baseline  Respiratory status: acceptable  Hydration status: stable  Pain management: adequate

## 2023-10-18 NOTE — DISCHARGE INSTRUCTIONS
Orthopaedic Sports Medicine  Post-Operative Discharge Instructions      Pain Medication/Management  - Narcotic electronic-scribed to pharmacy listed in EPIC (Follow prescription instructions)  - IF taking a narcotic with acetaminophen then do not take additional acetaminophen. IF not, then Tylenol (650mg) - take 1 tab every 8 hours x 1 week  - Enteric Coated Aspirin (325mg) Over The Counter - take 1 tab daily x 3 weeks with food -  Start in AM on 10/19/23  - Colace (100mg) Over The Counter - take 1 tab twice daily as needed for a stool softener  * Wean off narcotic and start Tylenol (500mg) Over The Counter - take 1-2 tabs every 8 hours as needed for pain    Nonweightbearing right arm, use sling at all times except for hygiene. Ice right shoulder. Elevate right  wrist above right elbow and open close hand to circulate blood. May shower on 10/21/23, pat wounds dry air dry and then place bandaids and/or gauze/tape to cover all wounds and then return to sling use. Call PT in 24hrs to activate PT referral to start PT on 10/25/23. Check all paperwork for surgeon's follow up appointment date and time. Call office [phone] with any questions or concerns    * Day of Surgery - If you had a regional nerve block (extremity was numbed by anesthesia), it will wear off in 6-16 hours after surgery. It is recommended that you start the narcotic prescription once you get home to stay ahead of pain control even if the nerve block has not worn off yet. This will help limit severe pain from waking you up. It is also reasonable to set your alarm for every 6 hours so that you don't get behind in your pain control. * Elevation and Ice - For lower extremity surgery, elevate the operative extremity with rolled towels / pillows placed under the ankle/calf as tolerated. Move the position of towels or pillows around every so often to limit undue pressure to the back of the calf/heel.   NEVER place pillows or blankets under

## 2023-10-18 NOTE — ANESTHESIA PROCEDURE NOTES
Peripheral Block    Patient location during procedure: holding area  Reason for block: post-op pain management and at surgeon's request  Start time: 10/18/2023 6:50 AM  End time: 10/18/2023 7:00 AM  Staffing  Performed: anesthesiologist   Anesthesiologist: Antonietta Callaway MD  Performed by: Antonietta Callaway MD  Authorized by: Antonietta Callaway MD    Preanesthetic Checklist  Completed: patient identified, IV checked, site marked, risks and benefits discussed, surgical/procedural consents, equipment checked, pre-op evaluation, timeout performed, anesthesia consent given, oxygen available and monitors applied/VS acknowledged  Peripheral Block   Patient position: supine  Prep: ChloraPrep  Provider prep: sterile gloves and mask  Patient monitoring: IV access and continuous pulse ox  Block type: Brachial plexus  Interscalene  Laterality: right  Injection technique: single-shot  Guidance: ultrasound guided  Local infiltration: lidocaine  Infiltration strength: 2 %  Local infiltration: lidocaine  Dose: 1 mL    Needle   Needle type: combined needle/nerve stimulator   Needle gauge: 22 G  Needle localization: ultrasound guidance  Needle length: 5 cm  Assessment   Injection assessment: negative aspiration for heme  Paresthesia pain: none  Slow fractionated injection: yes  Hemodynamics: stable  Real-time US image taken/store: yes  Outcomes: uncomplicated and patient tolerated procedure well    Medications Administered  bupivacaine (MARCAINE) PF injection 0.25% - Perineural   20 mL - 10/18/2023 6:50:00 AM

## 2023-10-23 ENCOUNTER — TELEPHONE (OUTPATIENT)
Dept: ORTHOPEDIC SURGERY | Age: 58
End: 2023-10-23

## 2023-10-23 NOTE — TELEPHONE ENCOUNTER
I have spoken with patient 3/10 pain was reported. He said he has discont the pain medication and is on tylenol and aspirin. Pt starts tomorrow for post op.

## 2023-10-23 NOTE — TELEPHONE ENCOUNTER
OTHER    PATIENT CALLED TO RETURN PHONE [phone]. PT WANTED TO REPORT THAT HE DOES HAVE SLIGHT PAIN, BUT MANAGEABLE. HE APPRECIATES DR Trini Monsalve FOLLOWING UP.      PLEASE ADVISE

## 2023-10-24 ENCOUNTER — HOSPITAL ENCOUNTER (OUTPATIENT)
Dept: PHYSICAL THERAPY | Age: 58
Setting detail: THERAPIES SERIES
Discharge: HOME | End: 2023-10-24
Payer: COMMERCIAL

## 2023-10-24 DIAGNOSIS — M25.511 ACUTE PAIN OF RIGHT SHOULDER: Primary | ICD-10-CM

## 2023-10-24 DIAGNOSIS — R29.898 SHOULDER WEAKNESS: ICD-10-CM

## 2023-10-24 DIAGNOSIS — M25.611 SHOULDER STIFFNESS, RIGHT: ICD-10-CM

## 2023-10-24 DIAGNOSIS — R60.0 LOCALIZED EDEMA: ICD-10-CM

## 2023-10-24 PROCEDURE — 97016 VASOPNEUMATIC DEVICE THERAPY: CPT

## 2023-10-24 PROCEDURE — 97161 PT EVAL LOW COMPLEX 20 MIN: CPT

## 2023-10-24 PROCEDURE — 97110 THERAPEUTIC EXERCISES: CPT

## 2023-10-24 NOTE — FLOWSHEET NOTE
[address] and Therapy  [address] office: [phone] fax: [phone]      Physical Therapy: TREATMENT/PROGRESS NOTE   Patient: Micah Arce (65 y.o. male)   Treatment Date: 10/24/2023   :  1965 MRN: [id number]   Visit #: 1   Insurance Allowable Auth Needed   30 hard max []Yes    [x]No    Insurance: Payor: Grzegorz Doty / Plan: Annika enavuco [facility] / Product Type: *No Product type* /   Insurance ID: [id number] - ([address] [place] [address])  Secondary Insurance (if applicable):    Treatment Diagnosis:     ICD-10-CM    1. Acute pain of right shoulder  M25.511       2. Shoulder stiffness, right  M25.611       3. Localized edema  R60.0       4.  Shoulder weakness  R29.898          Medical Diagnosis:    Right shoulder pain, unspecified chronicity [M25.511]   Referring Physician: Cookie Florez MD  PCP: Steffi Mejia DO                             Plan of care signed (Y/N):     Date of Patient follow up with Physician: 23     Progress Report/POC: EVAL today  POC update due: 2023 (OR 10 visits /OR [address], whichever is less)    Sling and nonweightbearing status x6 weeks (23)    Protocols: Phase 1 Small sized rotator cuff repair and Phase 1 biceps tenodesis    Preferred Language for Healthcare:   [x]English       []other:    SUBJECTIVE EXAMINATION     Patient Report/Comments: see eval     Test used Initial score  10/24/23 10/24/2023   Pain Summary VAS 4/10    Functional questionnaire Quick DASH 84% disability    Other:                OBJECTIVE EXAMINATION     Observation:     Test measurements: see eval    Exercises/Interventions:     Therapeutic Ex (61085) Sommer Pacheco re-education (23594) resistance Sets/time Reps Notes/Cues/Progressions   Reviewed and completed HEP listed below  15'     Pt education regarding NWB and sling wearing schedule 6 wks, posturing and activity restriction including at work  5'

## 2023-10-27 ENCOUNTER — HOSPITAL ENCOUNTER (OUTPATIENT)
Dept: PHYSICAL THERAPY | Age: 58
Setting detail: THERAPIES SERIES
Discharge: HOME | End: 2023-10-27
Payer: COMMERCIAL

## 2023-10-27 PROCEDURE — 97140 MANUAL THERAPY 1/> REGIONS: CPT

## 2023-10-27 PROCEDURE — 97110 THERAPEUTIC EXERCISES: CPT

## 2023-10-27 PROCEDURE — 97016 VASOPNEUMATIC DEVICE THERAPY: CPT

## 2023-10-27 NOTE — FLOWSHEET NOTE
deficits in tightness, stiffness, weakness, and restrictions due to post op status which required ongoing skilled physical therapy and decision making. PT provided education on pt guarding in elbow flexion when out of sling for pendulums. Pt responded well to PROM and grade I/II mobilization for pain relief and muscle relaxation. Pt demonstrates good understanding of HEP and sling wearing/NWB status. Medical Necessity Documentation:  I certify that this patient meets the below criteria necessary for medical necessity for care and/or justification of therapy services: The patient has a musculoskeletal condition(s) with a corresponding ICD-10 code that is of complexity and severity that require skilled therapeutic intervention. This has a direct and significant impact on the need for therapy and significantly impacts the rate of recovery. Treatment/Activity Tolerance:  [x] Patient tolerated treatment well [] Patient limited by fatique  [] Patient limited by pain  [] Patient limited by other medical complications  [] Other:     Return to Play: NA    Prognosis for POC: [x] Good [] Fair  [] Poor    Patient requires continued skilled intervention: [x] Yes  [] No      GOALS     Patient stated goal: return function in shoulder with no pain  Status: [] Progressing: [] Met: [] Not Met: [] Adjusted    Therapist goals for Patient:   Short Term Goals: To be achieved in: 2 weeks  Independent in HEP and progression per patient tolerance, in order to progress toward full function and prevent re-injury. Status: [] Progressing: [] Met: [] Not Met: [] Adjusted  Patient will have a decrease in pain to 2/10 to help  facilitate improvement in movement, function, and ADLs as indicated by functional deficits. Status: [] Progressing: [] Met: [] Not Met: [] Adjusted    Long Term Goals:  To be achieved in: 8-10 weeks  Disability index score of 45% or less for the Quick DASH to assist with return to prior level of

## 2023-11-02 ENCOUNTER — HOSPITAL ENCOUNTER (OUTPATIENT)
Dept: PHYSICAL THERAPY | Age: 58
Setting detail: THERAPIES SERIES
Discharge: HOME | End: 2023-11-02
Payer: COMMERCIAL

## 2023-11-02 ENCOUNTER — OFFICE VISIT (OUTPATIENT)
Dept: ORTHOPEDIC SURGERY | Age: 58
End: 2023-11-02

## 2023-11-02 VITALS — WEIGHT: 145 LBS | BODY MASS INDEX: 24.16 KG/M2 | HEIGHT: 65 IN

## 2023-11-02 DIAGNOSIS — Z47.89 ORTHOPEDIC AFTERCARE: Primary | ICD-10-CM

## 2023-11-02 PROCEDURE — 97016 VASOPNEUMATIC DEVICE THERAPY: CPT

## 2023-11-02 PROCEDURE — 97110 THERAPEUTIC EXERCISES: CPT

## 2023-11-02 PROCEDURE — 99024 POSTOP FOLLOW-UP VISIT: CPT | Performed by: ORTHOPAEDIC SURGERY

## 2023-11-02 PROCEDURE — 97140 MANUAL THERAPY 1/> REGIONS: CPT

## 2023-11-02 NOTE — PROGRESS NOTES
POST OPERATIVE ORTHOPAEDIC NOTE    DOS: 10/18/2023  PROCEDURES: Right shoulder arthroscopic subacromial decompression, rotator cuff repair (small) and open subpectoral biceps tenodesis    The patient has been recovering. The patient states the pain is 0/10 pain. The patient has started PT. he has remained in the sling nonweightbearing. He self admits to discontinuing the aspirin. Focused pertinent physical examination of the operative extremity:  Wounds C/D/I, well healing surgical incisions  No erythema or drainage  Skin intact throughout  5/5 G IO EPL  SILT Ax, R, U, M  +2 radial pulse     Diagnosis Orders   1. Orthopedic aftercare          Assessment and plan: The patient is now 15 days status post the above listed procedure and is recovering    . --Greater than 50% of the time (11/20 minutes) was spent coordinating care and discussing postoperative recovery course  -I had a pleasant discussion with the patient today and reviewed with him intraoperative procedures performed as well as arthroscopic photos. -Sutures were removed and Steri-Strips were placed. The patient was educated as to scar tissue massage  -Aspirin 325 mg p.o. daily x3 weeks in total postoperatively for DVT/VTE prophylaxis. Reviewed with the patient the importance of resuming aspirin till next Wednesday which would be his 3-week out point  -OTC Tylenol per bottle as needed discomfort  -Nonweightbearing and sling use for total of 6 weeks. He will utilize for while showering  -Continue formal physical therapy working on small rotator cuff repair protocol with biceps tenodesis protocol  -All questions answered to the patient's satisfaction and the patient expressed understanding and agreement with the above listed treatment plan  -Follow up in 4 weeks time for routine postoperative 6-week visit. -Thank you for the clinical consultation and allowing me to participate in the patient's care.       Electronically signed by Brenda Jeffrey

## 2023-11-02 NOTE — FLOWSHEET NOTE
tightness, stiffness, weakness, and restrictions due to post op status which required ongoing skilled physical therapy and decision making. Pt tolerated PROM well this date with less muscle guarding, improved elbow extension at rest in pendulums, appropriate [address] [place] ROM within protocol phase. Pt demonstrates good understanding of HEP and sling wearing/NWB status. Medical Necessity Documentation:  I certify that this patient meets the below criteria necessary for medical necessity for care and/or justification of therapy services: The patient has a musculoskeletal condition(s) with a corresponding ICD-10 code that is of complexity and severity that require skilled therapeutic intervention. This has a direct and significant impact on the need for therapy and significantly impacts the rate of recovery. Treatment/Activity Tolerance:  [x] Patient tolerated treatment well [] Patient limited by fatique  [] Patient limited by pain  [] Patient limited by other medical complications  [] Other:     Return to Play: NA    Prognosis for POC: [x] Good [] Fair  [] Poor    Patient requires continued skilled intervention: [x] Yes  [] No      GOALS     Patient stated goal: return function in shoulder with no pain  Status: [] Progressing: [] Met: [] Not Met: [] Adjusted    Therapist goals for Patient:   Short Term Goals: To be achieved in: 2 weeks  Independent in HEP and progression per patient tolerance, in order to progress toward full function and prevent re-injury. Status: [x] Progressing: [] Met: [] Not Met: [] Adjusted  Patient will have a decrease in pain to 2/10 to help  facilitate improvement in movement, function, and ADLs as indicated by functional deficits. Status: [x] Progressing: [] Met: [] Not Met: [] Adjusted    Long Term Goals: To be achieved in: 8-10 weeks  Disability index score of 45% or less for the Quick DASH to assist with return to prior level of function.     Status: [] Progressing: [] Met: [] Not Met:

## 2023-11-07 ENCOUNTER — HOSPITAL ENCOUNTER (OUTPATIENT)
Dept: PHYSICAL THERAPY | Age: 58
Setting detail: THERAPIES SERIES
Discharge: HOME | End: 2023-11-07
Payer: COMMERCIAL

## 2023-11-07 PROCEDURE — 97140 MANUAL THERAPY 1/> REGIONS: CPT

## 2023-11-07 PROCEDURE — 97110 THERAPEUTIC EXERCISES: CPT

## 2023-11-07 PROCEDURE — 97016 VASOPNEUMATIC DEVICE THERAPY: CPT

## 2023-11-07 NOTE — FLOWSHEET NOTE
(32720)    [] Group Therapy (19097)     [] Estim Attended (27670)    [] Other: Total Timed Code Tx Minutes 40 3       Total Treatment Minutes 55        Charge Justification:  (71826) THERAPEUTIC EXERCISE - Provided verbal/tactile cueing for activities related to strengthening, flexibility, endurance, ROM performed to prevent loss of range of motion, maintain or improve muscular strength or increase flexibility, following either an injury or surgery. (47121) [address] [facility]- Reviewed/Progressed HEP activities related to strengthening, flexibility, endurance, ROM performed to prevent loss of range of motion, maintain or improve muscular strength or increase flexibility, following either an injury or surgery. (58764) VASOPNEUMATIC    TREATMENT PLAN   Plan: Cont POC- Continue emphasis/focus on modulating pain, promoting relaxation, reduce/eliminate soft tissue swelling/inflammation/restriction, and allowing for proper ROM. Next visit plan to continue current phase and PROM, isometrics     Electronically Signed by Latricia Estrada PT              Date: 11/07/2023     Note: If patient does not return for scheduled/recommended follow up visits, this note will serve as a discharge from care along with the most recent update on progress.

## 2023-11-09 ENCOUNTER — HOSPITAL ENCOUNTER (OUTPATIENT)
Dept: PHYSICAL THERAPY | Age: 58
Setting detail: THERAPIES SERIES
Discharge: HOME | End: 2023-11-09
Payer: COMMERCIAL

## 2023-11-09 PROCEDURE — 97140 MANUAL THERAPY 1/> REGIONS: CPT

## 2023-11-09 PROCEDURE — 97110 THERAPEUTIC EXERCISES: CPT

## 2023-11-09 NOTE — FLOWSHEET NOTE
[address] and Therapy  [address] office: [phone] fax: [phone]      Physical Therapy: TREATMENT/PROGRESS NOTE   Patient: Brit Gamino (9 y.o. male)   Treatment Date: 2023   :  1965 MRN: [id number]   Visit #: 5   Insurance Allowable Auth Needed   30 hard max []Yes    [x]No    Insurance: Payor: Aissatou  / Plan: Humberto Monday [facility] / Product Type: *No Product type* /   Insurance ID: [id number] - ([facility])  Secondary Insurance (if applicable):    Treatment Diagnosis:       ICD-10-CM     1. Acute pain of right shoulder  M25.511         2. Shoulder stiffness, right  M25.611         3. Localized edema  R60.0         4. Shoulder weakness  R29.898          Medical Diagnosis:    Right shoulder pain, unspecified chronicity [M25.511]   Referring Physician: Lizette Kelly MD  PCP: Justin Keith DO                             Plan of care signed (Y/N): Y    Date of Patient follow up with Physician: 23     Progress Report/POC: NO  POC update due: 2023 (OR 10 visits /OR [address], whichever is less)    Sling and nonweightbearing status x6 weeks (23)      Preferred Language for Healthcare:   []English       []other:    SUBJECTIVE EXAMINATION     Patient Report/Comments: Pt reports his pain/muscle soreness is much improved since Tuesday.      Test used Initial score  10/24/23 2023   Pain Summary VAS 4/10 2-3/10   Functional questionnaire Quick DASH 84% disability    Other:                OBJECTIVE EXAMINATION     Observation: wearing sling, PT educated on positioning of sling    Test measurements: PROM per protocol;     23 pt achieving 90 degrees flexion and abduction with minimal muscle guarding, responds well to PROM and prolonged stretch     Protocols:  Small sized rotator cuff repair AND biceps tenodesis    Surgery date: 10/18/23    Phase 1 Week 1-4 protocols until

## 2023-11-13 ENCOUNTER — ANESTHESIA EVENT (OUTPATIENT)
Dept: ENDOSCOPY | Age: 58
End: 2023-11-13
Payer: COMMERCIAL

## 2023-11-14 ENCOUNTER — HOSPITAL ENCOUNTER (OUTPATIENT)
Dept: PHYSICAL THERAPY | Age: 58
Setting detail: THERAPIES SERIES
Discharge: HOME | End: 2023-11-14
Payer: COMMERCIAL

## 2023-11-14 PROCEDURE — 97016 VASOPNEUMATIC DEVICE THERAPY: CPT

## 2023-11-14 PROCEDURE — 97110 THERAPEUTIC EXERCISES: CPT

## 2023-11-14 PROCEDURE — 97140 MANUAL THERAPY 1/> REGIONS: CPT

## 2023-11-14 PROCEDURE — 97112 NEUROMUSCULAR REEDUCATION: CPT

## 2023-11-14 NOTE — FLOWSHEET NOTE
[address] and Therapy  [address] office: [phone] fax: [phone]      Physical Therapy: TREATMENT/PROGRESS NOTE   Patient: Ed Quiles (29 y.o. male)   Treatment Date: 2023   :  1965 MRN: [id number]   Visit #: 6   Insurance Allowable Auth Needed   30 hard max []Yes    [x]No    Insurance: Payor: Nguyen Miramontes / Plan: Fransisco Hare [facility] / Product Type: *No Product type* /   Insurance ID: [id number] - ([address] [place] [address])  Secondary Insurance (if applicable):    Treatment Diagnosis:       ICD-10-CM     1. Acute pain of right shoulder  M25.511         2. Shoulder stiffness, right  M25.611         3. Localized edema  R60.0         4. Shoulder weakness  R29.898          Medical Diagnosis:    Right shoulder pain, unspecified chronicity [M25.511]   Referring Physician: Marianna Cerrato MD  PCP: Dontae Greenwood DO                             Plan of care signed (Y/N): Y    Date of Patient follow up with Physician: 23     Progress Report/POC: NO  POC update due: 2023 (OR 10 visits /OR [address], whichever is less)    Sling and nonweightbearing status x6 weeks (23)      Preferred Language for Healthcare:   []English       []other:    SUBJECTIVE EXAMINATION     Patient Report/Comments: Pt reports little to no pain, continues to wear sling and complete HEP with no issues.      Test used Initial score  10/24/23 2023   Pain Summary VAS 4/10 0-1/10   Functional questionnaire Quick DASH 84% disability    Other:                OBJECTIVE EXAMINATION     Observation: wearing sling, PT educated on positioning of sling    Test measurements: PROM per protocol;     23 pt achieving 90 degrees flexion and abduction with minimal muscle guarding, responds well to PROM and prolonged stretch     Protocols:  Small sized rotator cuff repair AND biceps tenodesis    Surgery date: 10/18/23    Phase 1 Week 1-4

## 2023-11-16 ENCOUNTER — HOSPITAL ENCOUNTER (OUTPATIENT)
Dept: PHYSICAL THERAPY | Age: 58
Setting detail: THERAPIES SERIES
Discharge: HOME | End: 2023-11-16
Payer: COMMERCIAL

## 2023-11-16 PROCEDURE — 97110 THERAPEUTIC EXERCISES: CPT

## 2023-11-16 PROCEDURE — 97140 MANUAL THERAPY 1/> REGIONS: CPT

## 2023-11-16 NOTE — FLOWSHEET NOTE
Unattended (22698)     [] Ther. Act (99462)    [] Liliya Perch. Traction (88046)     [] Gait (96057)    [] Dry Needle 1-2 muscle (75124)     [] Aquatic Therex (59125)    [] Dry Needle 3+ muscle (92096)     [] Iontophoresis (87004)    [] VASO (06750) 15' 1    [] Ultrasound (46472)    [] Group Therapy (78611)     [] Estim Attended (89618)    [] Other: cold pack    Total Timed Code Tx Minutes 40 3       Total Treatment Minutes 40        Charge Justification:  (49552) THERAPEUTIC EXERCISE - Provided verbal/tactile cueing for activities related to strengthening, flexibility, endurance, ROM performed to prevent loss of range of motion, maintain or improve muscular strength or increase flexibility, following either an injury or surgery. (31035) [address] [place] [address]- Reviewed/Progressed HEP activities related to strengthening, flexibility, endurance, ROM performed to prevent loss of range of motion, maintain or improve muscular strength or increase flexibility, following either an injury or surgery. (54907) NEUROMUSCULAR RE-EDUCATION- Therapeutic procedure, 1 or more areas, each 15 minutes; neuromuscular reeducation of movement, balance, coordination, kinesthetic sense, posture, and/or proprioception for sitting and/or standing activities  (65550) MANUAL THERAPY-  Manual therapy techniques, 1 or more regions, each 15 minutes (Mobilization/manipulation, manual lymphatic drainage, manual traction) for the purpose of modulating pain, promoting relaxation,  increasing ROM, reducing/eliminating soft tissue swelling/inflammation/restriction, improving soft tissue extensibility and allowing for proper ROM for normal function with self care, mobility, lifting and ambulation    TREATMENT PLAN   Plan: Cont POC- Continue emphasis/focus on modulating pain, promoting relaxation, reduce/eliminate soft tissue swelling/inflammation/restriction, and allowing for proper ROM.  Next visit plan to progress within protocol with addition of

## 2023-11-21 ENCOUNTER — HOSPITAL ENCOUNTER (OUTPATIENT)
Dept: PHYSICAL THERAPY | Age: 58
Setting detail: THERAPIES SERIES
Discharge: HOME | End: 2023-11-21
Payer: COMMERCIAL

## 2023-11-21 PROCEDURE — 97112 NEUROMUSCULAR REEDUCATION: CPT

## 2023-11-21 PROCEDURE — 97016 VASOPNEUMATIC DEVICE THERAPY: CPT

## 2023-11-21 PROCEDURE — 97140 MANUAL THERAPY 1/> REGIONS: CPT

## 2023-11-21 PROCEDURE — 97110 THERAPEUTIC EXERCISES: CPT

## 2023-11-21 NOTE — FLOWSHEET NOTE
limited by other medical complications  [] Other:     Return to Play: NA    Prognosis for POC: [x] Good [] Fair  [] Poor    Patient requires continued skilled intervention: [x] Yes  [] No      GOALS     Patient stated goal: return function in shoulder with no pain  Status: [] Progressing: [] Met: [] Not Met: [] Adjusted    Therapist goals for Patient:   Short Term Goals: To be achieved in: 2 weeks  Independent in HEP and progression per patient tolerance, in order to progress toward full function and prevent re-injury. Status: [x] Progressing: [] Met: [] Not Met: [] Adjusted  Patient will have a decrease in pain to 2/10 to help  facilitate improvement in movement, function, and ADLs as indicated by functional deficits. Status: [x] Progressing: [] Met: [] Not Met: [] Adjusted    Long Term Goals: To be achieved in: 8-10 weeks  Disability index score of 45% or less for the Quick DASH to assist with return to prior level of function. Status: [] Progressing: [] Met: [] Not Met: [] Adjusted  Improve shoulder AROM to [facility] to allow for proper joint functioning as indicated by patients functional deficits. Status: [] Progressing: [] Met: [] Not Met: [] Adjusted  Pt to improve strength to 4+/5 or better of rotator cuff, scapular retractors, shoulder elevators, biceps, and triceps to allow for proper muscle and joint use in functional mobility, ADLs and prior level of function   Status: [] Progressing: [] Met: [] Not Met: [] Adjusted  Patient will return to Reaching activities without increased symptoms or restriction to work towards return to prior level of function. Status: [] Progressing: [] Met: [] Not Met: [] Adjusted  Patient will resume normal work/leisure activities without pain. Status: [] Progressing: [] Met: [] Not Met: [] Adjusted     Overall Progression Towards Functional goals/ Treatment Progress Update:  [] Patient is progressing as expected towards functional goals listed.     []

## 2023-11-24 ENCOUNTER — HOSPITAL ENCOUNTER (OUTPATIENT)
Dept: PHYSICAL THERAPY | Age: 58
Setting detail: THERAPIES SERIES
Discharge: HOME | End: 2023-11-24
Payer: COMMERCIAL

## 2023-11-24 PROCEDURE — 97016 VASOPNEUMATIC DEVICE THERAPY: CPT

## 2023-11-24 PROCEDURE — 97140 MANUAL THERAPY 1/> REGIONS: CPT

## 2023-11-24 PROCEDURE — 97112 NEUROMUSCULAR REEDUCATION: CPT

## 2023-11-24 PROCEDURE — 97110 THERAPEUTIC EXERCISES: CPT

## 2023-11-24 NOTE — PLAN OF CARE
Education/Home Exercise Program: Patient HEP program created electronically. Refer to [address] [place] access code:    Access Code: [email or web address]  URL: [email or web address]  Date: 10/24/2023  Prepared by: Yenni Lotta    Exercises  - Circular Shoulder Pendulum with Table Support  - 2-3 x daily - 7 x weekly - 10 reps  - Flexion-Extension Shoulder Pendulum with Table Support  - 2-3 x daily - 7 x weekly - 10 reps  - Seated Elbow Flexion and Extension AROM  - 4-6 x daily - 7 x weekly - 3 sets - 10 reps  - Wrist Flexion AROM  - 4-6 x daily - 7 x weekly - 30 reps  - Wrist Extension AROM  - 4-6 x daily - 7 x weekly - 30 reps  - Seated Gripping Towel  - 4-6 x daily - 7 x weekly - 30 reps    Access Code: [email or web address]  URL: [email or web address]  Date: 11/21/2023  Prepared by: Yenni Kidcielo    Exercises  - Supine Shoulder Flexion Extension AAROM with Dowel  - 1 x daily - 7 x weekly - 2 sets - 10 reps - 5 seconds hold  - Standing Isometric Shoulder Internal Rotation at Doorway  - 1 x daily - 7 x weekly - 10 reps - 10 seconds hold  - Isometric Shoulder Extension at Wall  - 1 x daily - 7 x weekly - 10 reps - 10 seconds hold  - Isometric Shoulder Flexion at Wall  - 1 x daily - 7 x weekly - 10 reps - 10 seconds hold  - Standing Isometric Shoulder External Rotation with Doorway  - 1 x daily - 7 x weekly - 10 reps - 10 seconds hold    ASSESSMENT     Today's Assessment: Patient had good tolerance to today's session, reporting reduced pain and challenge with program completed. Able to progress  exercise difficulty and repetitions on AAROM, scapular isometrics, rhythmic stabilization. Continues to display deficits in tightness, stiffness, weakness, and restrictions due to post op status which required ongoing skilled physical therapy and decision making. Pt continues to tolerate PROM and AAROM with no pain and good tissue extensibility.  Pt requires frequent verbal and tactile cues throughout for preventing upper trapezius

## 2023-11-28 ENCOUNTER — HOSPITAL ENCOUNTER (OUTPATIENT)
Dept: PHYSICAL THERAPY | Age: 58
Setting detail: THERAPIES SERIES
Discharge: HOME | End: 2023-11-28
Payer: COMMERCIAL

## 2023-11-28 ENCOUNTER — OFFICE VISIT (OUTPATIENT)
Dept: ORTHOPEDIC SURGERY | Age: 58
End: 2023-11-28

## 2023-11-28 VITALS — BODY MASS INDEX: 24.16 KG/M2 | WEIGHT: 145 LBS | HEIGHT: 65 IN

## 2023-11-28 DIAGNOSIS — Z47.89 ORTHOPEDIC AFTERCARE: Primary | ICD-10-CM

## 2023-11-28 PROCEDURE — 97016 VASOPNEUMATIC DEVICE THERAPY: CPT

## 2023-11-28 PROCEDURE — 97112 NEUROMUSCULAR REEDUCATION: CPT

## 2023-11-28 PROCEDURE — 97110 THERAPEUTIC EXERCISES: CPT

## 2023-11-28 PROCEDURE — 99024 POSTOP FOLLOW-UP VISIT: CPT | Performed by: ORTHOPAEDIC SURGERY

## 2023-11-28 PROCEDURE — 97140 MANUAL THERAPY 1/> REGIONS: CPT

## 2023-11-28 NOTE — PROGRESS NOTES
POST OPERATIVE ORTHOPAEDIC NOTE    DOS: 10/18/2023  PROCEDURES: Right shoulder arthroscopic rotator cuff repair, subacromial decompression and open subpectoral biceps tenodesis    The patient has been recovering. The patient states the pain is 0-1/10 pain. The patient has continued PT. patient has remained nonweightbearing in the sling for the most part however he does self admit to typing while in the sling    Focused pertinent physical examination of the operative extremity:  Wounds C/D/I, well healed surgical incisions  Steri-Strips still on, these were removed. Slight scar tissue over the anterior portal  100 degrees forward flexion active range of motion, 140 degrees passive range of motion. Good cosmesis on biceps firing  Skin intact throughout  5/5 D B T G IO EPL  SILT Ax, R, U, M  +2 radial pulse     Diagnosis Orders   1. Orthopedic aftercare          Assessment and plan: The patient is now 6 weeks status post the above listed procedure and is recovering    . --Greater than 50% of the time (11/20 minutes) was spent coordinating care and discussing postoperative recovery course  -I had a pleasant discussion with the patient today. I reviewed with him that currently his clinical examination is well-appearing however he does need to work on scar tissue massage. He was instructed again on how to do so. -OTC Tylenol per bottle as needed discomfort  -Formal physical therapy will continue with rotator cuff repair protocol as previously implemented as well as biceps tenodesis protocol  -Sling was discontinued. 2 to 5 pound weight limit for the next 2 weeks.   He was instructed to still sleep in the sling for an additional 2 weeks  -I did review with him once again expectation management with recovery and timeline of duration of symptom resolution  -All questions answered to the patient's satisfaction and the patient expressed understanding and agreement with the above listed treatment plan  -Follow up in 6

## 2023-11-28 NOTE — FLOWSHEET NOTE
[address] [facility]  [address] office: [phone] fax: [phone]      Physical Therapy: TREATMENT/PROGRESS NOTE   Patient: Arlene Aguilar (87 y.o. male)   Treatment Date: 2023   :  1965 MRN: [id number]   Visit #: 10   Insurance Allowable Auth Needed   30 hard max []Yes    [x]No    Insurance: Payor: Emilia Rhodes / Plan: [facility] / Product Type: *No Product type* /   Insurance ID: [id number] - ([address] [place] [address])  Secondary Insurance (if applicable):    Treatment Diagnosis:       ICD-10-CM     1. Acute pain of right shoulder  M25.511         2. Shoulder stiffness, right  M25.611         3. Localized edema  R60.0         4. Shoulder weakness  R29.898          Medical Diagnosis:    Right shoulder pain, unspecified chronicity [M25.511]   Referring Physician: Mai Rivas MD  PCP: Rolando Tidwell DO                             Plan of care signed (Y/N): Y    Date of Patient follow up with Physician: 23     Progress Report/POC: NO  POC update due: 2023 (OR 10 visits /OR [address], whichever is less)        Preferred Language for Healthcare:   []English       []other:    SUBJECTIVE EXAMINATION     Patient Report/Comments: Pt reports little to no pain unless he \"does the wrong thing\" and gets a twinge down side of his arm. Pt had follow up with Dr. Amado Romo today and no more sling. Per Dr. Amado Romo note 23: Sling was discontinued. 2 to 5 pound weight limit for the next 2 weeks. He was instructed to still sleep in the sling for an additional 2 weeks ; he does need to work on scar tissue massage ;  Formal physical therapy will continue with rotator cuff repair protocol as previously implemented as well as biceps tenodesis protocol      Test used Initial score  10/24/23 2023   Pain Summary VAS 4/10 1/10   Functional questionnaire Quick DASH 84% disability 68%   Other:

## 2023-11-30 ENCOUNTER — HOSPITAL ENCOUNTER (OUTPATIENT)
Dept: PHYSICAL THERAPY | Age: 58
Setting detail: THERAPIES SERIES
Discharge: HOME | End: 2023-11-30
Payer: COMMERCIAL

## 2023-11-30 PROCEDURE — 97110 THERAPEUTIC EXERCISES: CPT

## 2023-11-30 PROCEDURE — 97016 VASOPNEUMATIC DEVICE THERAPY: CPT

## 2023-11-30 PROCEDURE — 97112 NEUROMUSCULAR REEDUCATION: CPT

## 2023-11-30 PROCEDURE — 97140 MANUAL THERAPY 1/> REGIONS: CPT

## 2023-11-30 NOTE — FLOWSHEET NOTE
[address] [facility]  [address] office: [phone] fax: [phone]      Physical Therapy: TREATMENT/PROGRESS NOTE   Patient: Michelle Grier (4 y.o. male)   Treatment Date: 2023   :  1965 MRN: [id number]   Visit #: 11   Insurance Allowable Auth Needed   30 hard max []Yes    [x]No    Insurance: Payor: Ramirez Gracepaxton / Plan: [facility] / Product Type: *No Product type* /   Insurance ID: [id number] - ([facility])  Secondary Insurance (if applicable):    Treatment Diagnosis:       ICD-10-CM     1. Acute pain of right shoulder  M25.511         2. Shoulder stiffness, right  M25.611         3. Localized edema  R60.0         4. Shoulder weakness  R29.898          Medical Diagnosis:    Right shoulder pain, unspecified chronicity [M25.511]   Referring Physician: Ro Jefferson MD  PCP: Denise Yen DO                             Plan of care signed (Y/N): Y    Date of Patient follow up with Physician: 23     Progress Report/POC: NO  POC update due: 2023 (OR 10 visits /OR [address], whichever is less)        Preferred Language for Healthcare:   []English       []other:    SUBJECTIVE EXAMINATION     Patient Report/Comments: Pt reports increased soreness today, says he notices increased discomfort since being out of the sling. Per Dr. Dorota Curtis note 23: Sling was discontinued. 2 to 5 pound weight limit for the next 2 weeks. He was instructed to still sleep in the sling for an additional 2 weeks ; he does need to work on scar tissue massage ;  Formal physical therapy will continue with rotator cuff repair protocol as previously implemented as well as biceps tenodesis protocol      Test used Initial score  10/24/23 2023 11/30/23   Pain Summary VAS 4/10 1/10 2-3/10   Functional questionnaire Quick DASH 84% disability 68%    Other:                      PROM L PROM R 23 PROM 23

## 2023-12-05 ENCOUNTER — ANESTHESIA (OUTPATIENT)
Dept: ENDOSCOPY | Age: 58
End: 2023-12-05
Payer: COMMERCIAL

## 2023-12-05 ENCOUNTER — APPOINTMENT (OUTPATIENT)
Dept: PHYSICAL THERAPY | Age: 58
End: 2023-12-05
Payer: COMMERCIAL

## 2023-12-05 ENCOUNTER — HOSPITAL ENCOUNTER (OUTPATIENT)
Age: 58
Setting detail: OUTPATIENT SURGERY
Discharge: HOME | End: 2023-12-05
Attending: INTERNAL MEDICINE | Admitting: INTERNAL MEDICINE
Payer: COMMERCIAL

## 2023-12-05 VITALS
OXYGEN SATURATION: 99 % | DIASTOLIC BLOOD PRESSURE: 87 MMHG | HEIGHT: 65 IN | SYSTOLIC BLOOD PRESSURE: 121 MMHG | TEMPERATURE: 97.5 F | HEART RATE: 63 BPM | BODY MASS INDEX: 24.16 KG/M2 | RESPIRATION RATE: 16 BRPM | WEIGHT: 145 LBS

## 2023-12-05 DIAGNOSIS — Z12.11 SPECIAL SCREENING FOR MALIGNANT NEOPLASMS, COLON: ICD-10-CM

## 2023-12-05 PROCEDURE — 88305 TISSUE EXAM BY PATHOLOGIST: CPT

## 2023-12-05 PROCEDURE — 3609010600 HC COLONOSCOPY POLYPECTOMY SNARE/COLD BIOPSY: Performed by: INTERNAL MEDICINE

## 2023-12-05 PROCEDURE — 2500000003 HC RX 250 WO HCPCS: Performed by: ANESTHESIOLOGY

## 2023-12-05 PROCEDURE — 7100000011 HC PHASE II RECOVERY - ADDTL 15 MIN: Performed by: INTERNAL MEDICINE

## 2023-12-05 PROCEDURE — 2500000003 HC RX 250 WO HCPCS: Performed by: NURSE ANESTHETIST, CERTIFIED REGISTERED

## 2023-12-05 PROCEDURE — 6360000002 HC RX W HCPCS: Performed by: NURSE ANESTHETIST, CERTIFIED REGISTERED

## 2023-12-05 PROCEDURE — 6360000002 HC RX W HCPCS: Performed by: ANESTHESIOLOGY

## 2023-12-05 PROCEDURE — 7100000010 HC PHASE II RECOVERY - FIRST 15 MIN: Performed by: INTERNAL MEDICINE

## 2023-12-05 PROCEDURE — 3700000000 HC ANESTHESIA ATTENDED CARE: Performed by: INTERNAL MEDICINE

## 2023-12-05 PROCEDURE — 2709999900 HC NON-CHARGEABLE SUPPLY: Performed by: INTERNAL MEDICINE

## 2023-12-05 PROCEDURE — 3700000001 HC ADD 15 MINUTES (ANESTHESIA): Performed by: INTERNAL MEDICINE

## 2023-12-05 PROCEDURE — 2580000003 HC RX 258: Performed by: ANESTHESIOLOGY

## 2023-12-05 RX ORDER — OXYCODONE HYDROCHLORIDE 5 MG/1
10 TABLET ORAL PRN
Status: CANCELLED | OUTPATIENT
Start: 2023-12-05 | End: 2023-12-05

## 2023-12-05 RX ORDER — ONDANSETRON 2 MG/ML
4 INJECTION INTRAMUSCULAR; INTRAVENOUS ONCE
Status: COMPLETED | OUTPATIENT
Start: 2023-12-05 | End: 2023-12-05

## 2023-12-05 RX ORDER — SODIUM CHLORIDE, SODIUM LACTATE, POTASSIUM CHLORIDE, CALCIUM CHLORIDE 600; 310; 30; 20 MG/100ML; MG/100ML; MG/100ML; MG/100ML
INJECTION, SOLUTION INTRAVENOUS CONTINUOUS
Status: DISCONTINUED | OUTPATIENT
Start: 2023-12-05 | End: 2023-12-05 | Stop reason: HOSPADM

## 2023-12-05 RX ORDER — OXYCODONE HYDROCHLORIDE 5 MG/1
5 TABLET ORAL PRN
Status: CANCELLED | OUTPATIENT
Start: 2023-12-05 | End: 2023-12-05

## 2023-12-05 RX ORDER — SODIUM CHLORIDE 0.9 % (FLUSH) 0.9 %
5-40 SYRINGE (ML) INJECTION PRN
Status: CANCELLED | OUTPATIENT
Start: 2023-12-05

## 2023-12-05 RX ORDER — SODIUM CHLORIDE 0.9 % (FLUSH) 0.9 %
5-40 SYRINGE (ML) INJECTION PRN
Status: DISCONTINUED | OUTPATIENT
Start: 2023-12-05 | End: 2023-12-05 | Stop reason: HOSPADM

## 2023-12-05 RX ORDER — PROPOFOL 10 MG/ML
INJECTION, EMULSION INTRAVENOUS PRN
Status: DISCONTINUED | OUTPATIENT
Start: 2023-12-05 | End: 2023-12-05 | Stop reason: SDUPTHER

## 2023-12-05 RX ORDER — SODIUM CHLORIDE 0.9 % (FLUSH) 0.9 %
5-40 SYRINGE (ML) INJECTION EVERY 12 HOURS SCHEDULED
Status: DISCONTINUED | OUTPATIENT
Start: 2023-12-05 | End: 2023-12-05 | Stop reason: HOSPADM

## 2023-12-05 RX ORDER — FAMOTIDINE 10 MG/ML
20 INJECTION, SOLUTION INTRAVENOUS ONCE
Status: COMPLETED | OUTPATIENT
Start: 2023-12-05 | End: 2023-12-05

## 2023-12-05 RX ORDER — LIDOCAINE HYDROCHLORIDE 20 MG/ML
INJECTION, SOLUTION INFILTRATION; PERINEURAL PRN
Status: DISCONTINUED | OUTPATIENT
Start: 2023-12-05 | End: 2023-12-05 | Stop reason: SDUPTHER

## 2023-12-05 RX ORDER — SODIUM CHLORIDE 0.9 % (FLUSH) 0.9 %
5-40 SYRINGE (ML) INJECTION EVERY 12 HOURS SCHEDULED
Status: CANCELLED | OUTPATIENT
Start: 2023-12-05

## 2023-12-05 RX ORDER — MEPERIDINE HYDROCHLORIDE 25 MG/ML
12.5 INJECTION INTRAMUSCULAR; INTRAVENOUS; SUBCUTANEOUS EVERY 5 MIN PRN
Status: CANCELLED | OUTPATIENT
Start: 2023-12-05

## 2023-12-05 RX ORDER — ONDANSETRON 2 MG/ML
4 INJECTION INTRAMUSCULAR; INTRAVENOUS
Status: CANCELLED | OUTPATIENT
Start: 2023-12-05 | End: 2023-12-06

## 2023-12-05 RX ORDER — SODIUM CHLORIDE 9 MG/ML
INJECTION, SOLUTION INTRAVENOUS PRN
Status: CANCELLED | OUTPATIENT
Start: 2023-12-05

## 2023-12-05 RX ORDER — SODIUM CHLORIDE 9 MG/ML
INJECTION, SOLUTION INTRAVENOUS PRN
Status: DISCONTINUED | OUTPATIENT
Start: 2023-12-05 | End: 2023-12-05 | Stop reason: HOSPADM

## 2023-12-05 RX ADMIN — PROPOFOL 20 MG: 10 INJECTION, EMULSION INTRAVENOUS at 11:42

## 2023-12-05 RX ADMIN — PROPOFOL 20 MG: 10 INJECTION, EMULSION INTRAVENOUS at 11:40

## 2023-12-05 RX ADMIN — PROPOFOL 20 MG: 10 INJECTION, EMULSION INTRAVENOUS at 11:44

## 2023-12-05 RX ADMIN — FAMOTIDINE 20 MG: 10 INJECTION, SOLUTION INTRAVENOUS at 11:00

## 2023-12-05 RX ADMIN — PROPOFOL 80 MG: 10 INJECTION, EMULSION INTRAVENOUS at 11:35

## 2023-12-05 RX ADMIN — PROPOFOL 20 MG: 10 INJECTION, EMULSION INTRAVENOUS at 11:48

## 2023-12-05 RX ADMIN — PROPOFOL 20 MG: 10 INJECTION, EMULSION INTRAVENOUS at 11:46

## 2023-12-05 RX ADMIN — ONDANSETRON 4 MG: 2 INJECTION INTRAMUSCULAR; INTRAVENOUS at 11:21

## 2023-12-05 RX ADMIN — PROPOFOL 20 MG: 10 INJECTION, EMULSION INTRAVENOUS at 11:36

## 2023-12-05 RX ADMIN — PROPOFOL 20 MG: 10 INJECTION, EMULSION INTRAVENOUS at 11:38

## 2023-12-05 RX ADMIN — SODIUM CHLORIDE, POTASSIUM CHLORIDE, SODIUM LACTATE AND CALCIUM CHLORIDE: 600; 310; 30; 20 INJECTION, SOLUTION INTRAVENOUS at 11:29

## 2023-12-05 RX ADMIN — LIDOCAINE HYDROCHLORIDE 60 MG: 20 INJECTION, SOLUTION INFILTRATION; PERINEURAL at 11:35

## 2023-12-05 ASSESSMENT — LIFESTYLE VARIABLES: SMOKING_STATUS: 0

## 2023-12-05 ASSESSMENT — PAIN - FUNCTIONAL ASSESSMENT: PAIN_FUNCTIONAL_ASSESSMENT: NONE - DENIES PAIN

## 2023-12-05 ASSESSMENT — ENCOUNTER SYMPTOMS: SHORTNESS OF BREATH: 0

## 2023-12-05 NOTE — ANESTHESIA PRE PROCEDURE
Department of Anesthesiology  Preprocedure Note       Name:  Shad Spann   Age:  66 y.o.  :  1965                                          MRN:  [id number]         Date:  2023      Surgeon: Yareli Mcnamara):  Bety Poole DO    Procedure: Procedure(s):  COLON W/ANES. (12:00)    Medications prior to admission:   Prior to Admission medications    Medication Sig Start Date End Date Taking? Authorizing Provider   azelastine (ASTELIN) 0.1 % nasal spray 1 spray by Nasal route 2 times daily Use in each nostril as directed  Patient taking differently: 1 spray by Nasal route 2 times daily as needed Use in each nostril as directed 23   Masoud Ward DO   fluticasone (FLONASE) 50 MCG/ACT nasal spray 2 sprays by Each Nostril route in the morning and at bedtime  Patient taking differently: 2 sprays by Each Nostril route 2 times daily as needed 23   Masoud Ward DO   econazole nitrate 1 % cream Apply topically daily Apply topically daily.     Teofilo Lala MD   Hydrocortisone Butyrate 0.1 % CREA Apply topically daily     Teofilo Lala MD   naphazoline-pheniramine (NAPHCON-A) 0.025-0.3 % ophthalmic solution Place 1 drop into both eyes as needed    Teofilo Lala MD   cetirizine (ZYRTEC) 10 MG tablet Take 1 tablet by mouth as needed    Teofilo Lala MD       Current medications:    Current Facility-Administered Medications   Medication Dose Route Frequency Provider Last Rate Last Admin    lactated ringers IV soln infusion   IntraVENous Continuous Luis Fernando De La Rosa MD        sodium chloride flush 0.9 % injection 5-40 mL  5-40 mL IntraVENous 2 times per day Luis Fernando De La Rosa MD        sodium chloride flush 0.9 % injection 5-40 mL  5-40 mL IntraVENous PRN Luis Fernando De La Rosa MD        0.9 % sodium chloride infusion   IntraVENous PRN Luis Fernando De La Rosa MD           Allergies:  No Known Allergies    Problem List:    Patient Active Problem List   Diagnosis

## 2023-12-05 NOTE — PROCEDURES
COLONOSCOPY     Patient: Raul Vale MRN: [id number]   YOB: 1965 Age: 66 y.o. Sex: male   Unit: [address] [place] [address] ENDOSCOPY Room/Bed: ENDO/NONE Location: [address] [place] [address],[address]    Admitting Physician: Amber Cochran     Primary Care Physician: Zenon Benitez DO      DATE OF PROCEDURE: 12/5/2023  PROCEDURE: Colonoscopy    PREOPERATIVE DIAGNOSIS: Special screening for malignant neoplasms, colon [Z12.11]  HPI: This is a 66y.o. year old male who presents today with colon polyps. ENDOSCOPIST: Susan Muller DO    POSTOPERATIVE DIAGNOSIS:    Left-sided diverticulosis  6 mm polyp in the sigmoid colon    PLAN:   High-fiber diet  Await results of biopsies    INFORMED CONSENT:  Informed consent for colonoscopy was obtained. The benefits and risks including adverse medicine reaction and perforation have been explained. The patient's questions were answered and the patient agreed to proceed. ASA: ASA 2 - Patient with mild systemic disease with no functional limitations     SEDATION: MAC    The patient's vital signs, cardiac status, pulmonary status, abdominal status and mental status were stable for the procedure. The patient's vital signs and respiratory function as monitored by oxygen saturation remained stable. COLON PREPARATION:  The patient was given a split colon preparation and the preparation was adequate. Procedure Details: An anal exam was performed and this was unremarkable. A digital rectal exam was performed and no masses palpated. The Olympus videocolonoscope  was inserted in the rectum and carefully advanced to the cecum as identified by IC valve, crow's foot appearance and appendix. The cecum was photodocumented. The colonoscope was slowly withdrawn and retrograde examination of the colon was carefully performed with inspection around and between folds. The ascending colon and cecum were intubated twice with repeat antegrade and retrograde examination.     There is

## 2023-12-05 NOTE — DISCHARGE INSTRUCTIONS
PATIENT INSTRUCTIONS  POST-SEDATION    Isidra Patterson          IMMEDIATELY FOLLOWING PROCEDURE:    Do not drive or operate machinery for the first twenty four hours after surgery. Do not make any important decisions for twenty four hours after surgery or while taking narcotic pain medications or sedatives. You should NOT BE LEFT UNATTENDED OR ALONE. A responsible adult should be with you for the rest of the day of your procedure and also during the night for your protection and safety. You may experience some light headedness. Rest at home with activity as tolerated. You may not need to go to bed, but it is important to rest for the next 24 hours. You should not engage in athletic sports such as basketball, volleyball, jogging, skating, or activities requiring refined motor skills for 24 hours. If you develop intractable nausea and vomiting or a severe headache please notify your doctor immediately. You are not expected to have any fever, but if you feel warm, take your temperature. If you have a fever 101 degrees or higher, call your doctor. If you have had an Endoscopy:   *Eat lightly for your first meal and gradually resume your normal / prescribed diet. DO NOT eat or drink until your gag reflex returns. *If you have had a colonoscopy, do not expect a normal bowel movement for approximately three days due to the cleansing of the large intestine prior to colonoscopy. ONCE YOU ARE HOME, IF YOU SHOULD HAVE:  Difficulty in breathing, persistent nausea or vomiting, bleeding you feel is excessive, or pain that is unusual, increased abdominal bloating, or any swelling, fever / chills, call your physician. If you cannot contact your physician, but feel that your signs and symptoms need a physician's attention, go to the Emergency Department. FOLLOW-UP:    Please follow up with Dr. Shirley Da Silva as scheduled or needed. Dr. Gracie Primrose office will call you with the biopsy findings.   Call Dr. Roxana Abel if

## 2023-12-05 NOTE — PROGRESS NOTES
..1.  Do not eat or drink anything after 12 midnight prior to surgery. This includes no water, chewing gum or mints, except for bowel prep complete per MD.  2.  Take the following pills with a small sip of water on the morning of surgery 12/05/2023.  3.  Aspirin, Ibuprofen, Advil, Naproxen, Vitamin E and other Anti-inflammatory products should be stopped for 5 days before surgery or as directed by your physician. 4.  Check with your doctor regarding stopping Plavix, Coumadin, Lovenox, Fragmin or other blood thinners. 5.  Do not smoke and do not drink alcoholic beverages 24 hours prior to surgery. This includes NA Beer. 6.  You may brush your teeth and gargle the morning of surgery. DO NOT SWALLOW WATER.  7.  You MUST make arrangements for a responsible adult to take you home after your surgery. You will not be allowed to leave alone or drive yourself home. It is strongly suggested someone stay with you the first 24 hours. Your surgery will be cancelled if you do not have a ride home. 8.  A parent/legal guardian must accompany a child scheduled for surgery and plan to stay at the hospital until the child is discharged. Please do not bring other children with you. 9.  Please wear simple, loose fitting clothing to the hospital.  [place] not bring valuables ( money, credit cards, checkbooks, etc.)  Do not wear any makeup (including no eye makeup) or nail polish on your fingers or toes. 10.  Do not wear any jewelry or piercing on the day of surgery. All body piercing jewelry must be removed. 11.  If you have dentures, they will be removed before going to the OR; we will provide you a container. If you wear contact lenses or glasses, they will be removed; please bring a case for them.   15.  Notify your Surgeon if you develop any illness between now and surgery time; cough, cold, fever, sore throat, nausea, vomiting, etc.  Please notify your surgeon if you experience dizziness, shortness of breath or blurred
Discharge instructions explained in detail at bedside to both pt an pts son Kleber Banda. Pt and son received copy of discharge instructions. Pt  and son deny having any questions about discharge.
IV x 1 removed per discharge hemostasis achieved, dressing applied, no complications noted. Patient denies further needs. Pt transported to private car via wheel chair. Vitals per doc flow, pt keke Reilly assumes responsibility.
Pre-Operative:  1. Patient/Caregiver identifies - states name and date of birth. 2.  The patient is free from signs and symptoms of injury. 3.  The patient receives appropriate medication(s), safely administered during the Perioperative period. 4.  The patients's fluid, electrolyte, and acid-base balances are established preoperatively. 5.  The patient's pulmonary function is established preoperatively. 6.  The patient's cardiovascular status is established preoperatively. 7.  The patient / caregiver demonstrates knowledge of nutritional management related to the operative or other invasive procedure. 8.  The patient/caregiver demonstrates knowledge of medication management. 9.  The patient/caregiver demonstrates knowledge of pain management. 10.  The patient/caregiver participates in decisions affection his or her Perioperative plan of care. 11. The patient's care is consistent with the individualized Perioperative plan of care. 12.  The patient's right to privacy is maintained. 13. The patient is the recipient of competent and ethical care within legal standards of practice. 14.  The patient's value system, lifestyle, ethnicity, and culture are considered, respected, and incorporated in the Perioperative plan of care and understands special services available. 15.  The patient demonstrates and/or reports adequate pain control throughout the the Perioperative period. 16.  The patient's neurological status is established preoperatively. 17.  The patient/caregiver demonstrates knowledge of the expected responses to the endoscopy procedure. 18.  Patient/Caregiver has reduced anxiety. Interventions- Familiarize with environment and equipment. 19. Patient/Caregiver verbalizes understanding of Phase II and/or Phase I process. 20.  Patient pain level is established preoperatively using age appropriate pain scale. 21.   The patient will move to fall risk upon sedation- during and through the recovery
Pt arrived to PACU from [facility] in stable condition. Report received from [place] Phase II. Care of pt transferred at this time. Pt  placed on cont pulse ox and BP. Pt arrived to unit without any oxygen requirements. SPO2 95% on RA.
Pt son went back home during pts procedure. Called pt son Shaun Choi, letting him know pt was in recovery. Pt awake, sitting up drinking soda. Shaun Choi states it shouldn't take him more than 30min to arrive back to facility for pts discharge.
baseline levels established preoperatively. 23.  The patient/caregiver demonstrates knowledge of the expected responses to the operative or invasive procedure. 20.  Patient/Caregiver has reduced anxiety. Interventions- Familiarize with environment and equipment.   21. Other:  22. Other:

## 2023-12-05 NOTE — H&P
[facility]  Outpatient Procedure H&P    Patient: Mila Alas MRN: [id number]     YOB: 1965  Age: 66 y.o. Sex: male    Unit: [facility] Room/Bed: ENDO/NONE Location: [address] [place] [address],[facility] Floor     Procedure: Procedure(s):  COLON W/ANES. (12:00)    Indication:CRCS  Referring  Physician:        Brief history: Last colon 2017    Nurses past medical history notes reviewed and agreed. Medications reviewed. Allergies: Patient has no known allergies.      Allergies noted: Yes     Past Medical History:   Past Medical History:   Diagnosis Date    Allergic rhinitis     Cancer ([address])     basal cell skin    PONV (postoperative nausea and vomiting)     Psoriasis        Past Surgical History:   Past Surgical History:   Procedure Laterality Date    APPENDECTOMY      COLONOSCOPY  2007    COLONOSCOPY  08/25/2017    MOHS SURGERY  2009    SHOULDER ARTHROSCOPY Right 10/18/2023    VIDEO ARTHROSCOPY RIGHT SHOULDER,  SUBACROMIAL DECOMPRESSION,ROTATOR CUFF REPAIR, OPEN SUBPECTORAL BICEPS TENODESIS  performed by Demetria Henderson MD at [facility]         Social History:   Social History     Socioeconomic History    Marital status:      Spouse name: Not on file    Number of children: Not on file    Years of education: Not on file    Highest education level: Not on file   Occupational History    Not on file   Tobacco Use    Smoking status: Never    Smokeless tobacco: Never   Vaping Use    Vaping Use: Never used   Substance and Sexual Activity    Alcohol use: No    Drug use: No    Sexual activity: Never   Other Topics Concern    Not on file   Social History Narrative    Not on file     Social Determinants of Health     Financial Resource Strain: Low Risk  (2/26/2021)    Overall Financial Resource Strain (CARDIA)     Difficulty of Paying Living Expenses: Not hard at all   Food Insecurity: No Food Insecurity (2/26/2021)    Hunger Vital Sign     Worried About Running

## 2023-12-06 ENCOUNTER — HOSPITAL ENCOUNTER (OUTPATIENT)
Dept: PHYSICAL THERAPY | Age: 58
Setting detail: THERAPIES SERIES
Discharge: HOME | End: 2023-12-06
Payer: COMMERCIAL

## 2023-12-06 PROCEDURE — 97140 MANUAL THERAPY 1/> REGIONS: CPT

## 2023-12-06 PROCEDURE — 97112 NEUROMUSCULAR REEDUCATION: CPT

## 2023-12-06 PROCEDURE — 97016 VASOPNEUMATIC DEVICE THERAPY: CPT

## 2023-12-06 PROCEDURE — 97110 THERAPEUTIC EXERCISES: CPT

## 2023-12-06 NOTE — FLOWSHEET NOTE
[address] and Therapy  [address] office: [phone] fax: [phone]      Physical Therapy: TREATMENT/PROGRESS NOTE   Patient: Isidra Patterson (13 y.o. male)   Treatment Date: 2023   :  1965 MRN: [id number]   Visit #: 12   Insurance Allowable Auth Needed   30 hard max []Yes    [x]No    Insurance: Payor: [facility] / Plan: [employer] [facility] / Product Type: *No Product type* /   Insurance ID: [id number] - ([facility])  Secondary Insurance (if applicable):    Treatment Diagnosis:       ICD-10-CM     1. Acute pain of right shoulder  M25.511         2. Shoulder stiffness, right  M25.611         3. Localized edema  R60.0         4. Shoulder weakness  R29.898          Medical Diagnosis:    Right shoulder pain, unspecified chronicity [M25.511]   Referring Physician: Katia Rasmussen MD  PCP: Daylin Mosley DO                             Plan of care signed (Y/N): Y    Date of Patient follow up with Physician: 23     Progress Report/POC: NO  POC update due: 2023 (OR 10 visits /OR [address], whichever is less)        Preferred Language for Healthcare:   []English       []other:    SUBJECTIVE EXAMINATION     Patient Report/Comments: Pt reports increased soreness today, says this weekend he was having increased pain but he was unable to take ibuprofen because he was preparing for a colonoscopy. Per Dr. Brian Nixon note 23: Sling was discontinued. 2 to 5 pound weight limit for the next 2 weeks. He was instructed to still sleep in the sling for an additional 2 weeks ; he does need to work on scar tissue massage ;  Formal physical therapy will continue with rotator cuff repair protocol as previously implemented as well as biceps tenodesis protocol      Test used Initial score  10/24/23 2023 11/30/23 12/6/23   Pain Summary VAS 4/10 1/10 2-3/10 2/10   Functional questionnaire Quick DASH 84%

## 2023-12-07 ENCOUNTER — APPOINTMENT (OUTPATIENT)
Dept: PHYSICAL THERAPY | Age: 58
End: 2023-12-07
Payer: COMMERCIAL

## 2023-12-08 ENCOUNTER — HOSPITAL ENCOUNTER (OUTPATIENT)
Dept: PHYSICAL THERAPY | Age: 58
Setting detail: THERAPIES SERIES
Discharge: HOME | End: 2023-12-08
Payer: COMMERCIAL

## 2023-12-08 PROCEDURE — 97112 NEUROMUSCULAR REEDUCATION: CPT

## 2023-12-08 PROCEDURE — 97110 THERAPEUTIC EXERCISES: CPT

## 2023-12-08 PROCEDURE — 97016 VASOPNEUMATIC DEVICE THERAPY: CPT

## 2023-12-08 PROCEDURE — 97140 MANUAL THERAPY 1/> REGIONS: CPT

## 2023-12-08 NOTE — FLOWSHEET NOTE
[address] and Therapy  [address], [address] [place] [address], [address] [place] [address] office: [phone] fax: [phone]      Physical Therapy: TREATMENT/PROGRESS NOTE   Patient: Mich Prado (2 y.o. male)   Treatment Date: 2023   :  1965 MRN: [id number]   Visit #: 13   Insurance Allowable Auth Needed   30 hard max []Yes    [x]No    Insurance: Payor: [employer] / Plan: [facility] / Product Type: *No Product type* /   Insurance ID: [id number] - ([facility])  Secondary Insurance (if applicable):    Treatment Diagnosis:       ICD-10-CM     1. Acute pain of right shoulder  M25.511         2. Shoulder stiffness, right  M25.611         3. Localized edema  R60.0         4. Shoulder weakness  R29.898          Medical Diagnosis:    Right shoulder pain, unspecified chronicity [M25.511]   Referring Physician: Xavier Starks MD  PCP: Sia Joyce DO                             Plan of care signed (Y/N): Y    Date of Patient follow up with Physician: 23     Progress Report/POC: NO  POC update due: 2023 (OR 10 visits /OR [address], whichever is less)        Preferred Language for Healthcare:   []English       []other:    SUBJECTIVE EXAMINATION     Patient Report/Comments: Pt reports continued achy, soreness 2/10. He has stopped taking ibuprofen because it has upset his stomach. Per Dr. Fields  note 23: Sling was discontinued. 2 to 5 pound weight limit for the next 2 weeks. He was instructed to still sleep in the sling for an additional 2 weeks ; he does need to work on scar tissue massage ;  Formal physical therapy will continue with rotator cuff repair protocol as previously implemented as well as biceps tenodesis protocol      Test used Initial score  10/24/23 2023 11/30/23 12/6/23   Pain Summary VAS 4/10 1/10 2-3/10 2/10   Functional questionnaire Quick DASH 84% disability 68%     Other:                        PROM L PROM R

## 2023-12-12 ENCOUNTER — HOSPITAL ENCOUNTER (OUTPATIENT)
Dept: PHYSICAL THERAPY | Age: 58
Setting detail: THERAPIES SERIES
Discharge: HOME | End: 2023-12-12
Payer: COMMERCIAL

## 2023-12-12 PROCEDURE — 97112 NEUROMUSCULAR REEDUCATION: CPT

## 2023-12-12 PROCEDURE — 97110 THERAPEUTIC EXERCISES: CPT

## 2023-12-12 PROCEDURE — 97016 VASOPNEUMATIC DEVICE THERAPY: CPT

## 2023-12-12 PROCEDURE — 97140 MANUAL THERAPY 1/> REGIONS: CPT

## 2023-12-12 NOTE — FLOWSHEET NOTE
[address] and Therapy  [address], [address] [place] [address] office: [phone] fax: [phone]      Physical Therapy: TREATMENT/PROGRESS NOTE   Patient: Mila Alas (56 y.o. male)   Treatment Date: 2023   :  1965 MRN: [id number]   Visit #: 14   Insurance Allowable Auth Needed   30 hard max []Yes    [x]No    Insurance: Payor: [employer] / Plan: [facility] / Product Type: *No Product type* /   Insurance ID: [id number] - ([address] [place] [address])  Secondary Insurance (if applicable):    Treatment Diagnosis:       ICD-10-CM     1. Acute pain of right shoulder  M25.511         2. Shoulder stiffness, right  M25.611         3. Localized edema  R60.0         4. Shoulder weakness  R29.898          Medical Diagnosis:    Right shoulder pain, unspecified chronicity [M25.511]   Referring Physician: Demetria Henderson MD  PCP: Sariah Mcgraw DO                             Plan of care signed (Y/N): Y    Date of Patient follow up with Physician: 23     Progress Report/POC: NO  POC update due: 2023 (OR 10 visits /OR [address], whichever is less)        Preferred Language for Healthcare:   []English       []other:    SUBJECTIVE EXAMINATION     Patient Report/Comments: Pt reports continued achy, soreness 2/10 but has noticed improvements in motion/pain since last session. He reports being able to reach the top of his head to shampoo and is happy with that progress. Per Dr. Adrian Ernst note 23: Sling was discontinued. 2 to 5 pound weight limit for the next 2 weeks. He was instructed to still sleep in the sling for an additional 2 weeks ; he does need to work on scar tissue massage ;  Formal physical therapy will continue with rotator cuff repair protocol as previously implemented as well as biceps tenodesis protocol      Test used Initial score  10/24/23 2023 11/30/23 12/6/23   Pain Summary VAS 4/10 110 2-3/10 2/10   Functional

## 2023-12-14 ENCOUNTER — HOSPITAL ENCOUNTER (OUTPATIENT)
Dept: PHYSICAL THERAPY | Age: 58
Setting detail: THERAPIES SERIES
Discharge: HOME | End: 2023-12-14
Payer: COMMERCIAL

## 2023-12-14 PROCEDURE — 97140 MANUAL THERAPY 1/> REGIONS: CPT | Performed by: PHYSICAL THERAPIST

## 2023-12-14 PROCEDURE — 97110 THERAPEUTIC EXERCISES: CPT | Performed by: PHYSICAL THERAPIST

## 2023-12-14 PROCEDURE — 97016 VASOPNEUMATIC DEVICE THERAPY: CPT | Performed by: PHYSICAL THERAPIST

## 2023-12-14 PROCEDURE — 97112 NEUROMUSCULAR REEDUCATION: CPT | Performed by: PHYSICAL THERAPIST

## 2023-12-14 NOTE — FLOWSHEET NOTE
normal work/leisure activities without pain. Status: [x] Progressing: [] Met: [] Not Met: [] Adjusted     Overall Progression Towards Functional goals/ Treatment Progress Update:  [x] Patient is progressing as expected towards functional goals listed. [] Progression is slowed due to complexities/Impairments listed. [] Progression has been slowed due to co-morbidities. [] Plan just implemented, too soon (<30days) to assess goals progression   [] Goals require adjustment due to lack of progress  [] Patient is not progressing as expected and requires additional follow up with physician  [] Other:     CHARGE CAPTURE     CHARGE GRID   CPT Code (TIMED) minutes # CPT Code (UNTIMED) #     [x] Therex (91382)  15' 1  [] EVAL:LOW (39923 - Typically 20 minutes face-to-face)     [x] Neuromusc. Re-ed (35367) 10 1  [] Re-Eval (66629)     [x] Manual (37273) 15' 1  [] Estim Unattended (31718)     [] Ther. Act (09774)    [] Elieser Dominguez. Traction (74130)     [] Gait (62037)    [] Dry Needle 1-2 muscle (66999)     [] Aquatic Therex (51270)    [] Dry Needle 3+ muscle (20350)     [] Iontophoresis (30175)    [x] VASO (11393) 15' 1    [] Ultrasound (95571)    [] Group Therapy (68417)     [] Estim Attended (96985)    [] Other: cold pack    Total Timed Code Tx Minutes 40' 3       Total Treatment Minutes 65'        Charge Justification:  (82728) THERAPEUTIC EXERCISE - Provided verbal/tactile cueing for activities related to strengthening, flexibility, endurance, ROM performed to prevent loss of range of motion, maintain or improve muscular strength or increase flexibility, following either an injury or surgery. (46044) [facility]- Reviewed/Progressed HEP activities related to strengthening, flexibility, endurance, ROM performed to prevent loss of range of motion, maintain or improve muscular strength or increase flexibility, following either an injury or surgery.   (05021) NEUROMUSCULAR RE-EDUCATION- Therapeutic

## 2023-12-26 ENCOUNTER — HOSPITAL ENCOUNTER (OUTPATIENT)
Dept: PHYSICAL THERAPY | Age: 58
Setting detail: THERAPIES SERIES
Discharge: HOME | End: 2023-12-26
Payer: COMMERCIAL

## 2023-12-26 PROCEDURE — 97016 VASOPNEUMATIC DEVICE THERAPY: CPT

## 2023-12-26 PROCEDURE — 97112 NEUROMUSCULAR REEDUCATION: CPT

## 2023-12-26 PROCEDURE — 97110 THERAPEUTIC EXERCISES: CPT

## 2023-12-26 PROCEDURE — 97140 MANUAL THERAPY 1/> REGIONS: CPT

## 2023-12-26 NOTE — PLAN OF CARE
[address] and Therapy  [address] office: [phone] fax: [phone]    Physical Therapy Re-Certification Plan of Care    Dear Kevin Hernandez MD  ,    We had the pleasure of treating the following patient for physical therapy services at [address] [place] [address]. A summary of our findings can be found in the updated assessment below. This includes our plan of care. If you have any questions or concerns regarding these findings, please do not hesitate to contact me at the office phone number checked above. Thank you for the referral.     Physician Signature:________________________________Date:__________________  By signing above (or electronic signature), therapist's plan is approved by physician      Functional Outcome:   Noel Pantoja 1965 continues to present with functional deficits in ROM, strength symmetry, endurance of strength, and eccentric control  limiting ability with reaching overhead, carrying items, lifting items, pushing or pulling activity, and ADLs . During therapy this date, patient required visual cueing, verbal cueing, tactile cueing, progression of exercises and program, and manual interventions for exercise progression, improving proper muscle recruitment and activation/motor control patterns, allowing for proper ROM, and improving postural awareness. Patient will continue to benefit from ongoing evaluation and advanced clinical decision from a Physical Therapist to improve ROM, muscle strength, neuromuscular control, functional mobility, and proper body mechanics to safely return to PLOF without symptoms or restrictions.     Overall Response to Treatment:   [x]Patient is responding well to treatment and improvement is noted with regards to goals   []Patient should continue to improve in reasonable time if they continue HEP   []Patient has plateaued and is no longer responding to

## 2023-12-28 ENCOUNTER — APPOINTMENT (OUTPATIENT)
Dept: PHYSICAL THERAPY | Age: 58
End: 2023-12-28
Payer: COMMERCIAL

## 2024-01-02 ENCOUNTER — HOSPITAL ENCOUNTER (OUTPATIENT)
Dept: PHYSICAL THERAPY | Age: 59
Setting detail: THERAPIES SERIES
Discharge: HOME | End: 2024-01-02
Payer: COMMERCIAL

## 2024-01-02 PROCEDURE — 97140 MANUAL THERAPY 1/> REGIONS: CPT

## 2024-01-02 PROCEDURE — 97112 NEUROMUSCULAR REEDUCATION: CPT

## 2024-01-02 PROCEDURE — 97016 VASOPNEUMATIC DEVICE THERAPY: CPT

## 2024-01-02 PROCEDURE — 97110 THERAPEUTIC EXERCISES: CPT

## 2024-01-02 NOTE — FLOWSHEET NOTE
[facility]- Outpatient Rehabilitation and Therapy  [address], [place] [address] office: [phone] fax: [phone]        Physical Therapy: TREATMENT/PROGRESS NOTE   Patient: Hank Valdivia (58 y.o. male)   Treatment Date: 2024   :  1965 MRN: [id number]   Visit #: 19   Insurance Allowable Auth Needed   30 hard max []Yes    [x]No    Insurance: Payor: /   Insurance ID: No Subscriber Number on File  Secondary Insurance (if applicable):    Treatment Diagnosis:       ICD-10-CM     1. Acute pain of right shoulder  M25.511         2. Shoulder stiffness, right  M25.611         3. Localized edema  R60.0         4. Shoulder weakness  R29.898          Medical Diagnosis:    Right shoulder pain, unspecified chronicity [M25.511]   Referring Physician: Adam Cash MD  PCP: Jorge Sterling DO                             Plan of care signed (Y/N): Y    Date of Patient follow up with Physician: 23     Progress Report/POC: NO  POC update due: 24 (OR 10 visits /OR AUTH LIMITS, whichever is less)        Preferred Language for Healthcare:   []English       []other:    SUBJECTIVE EXAMINATION     Patient Report/Comments: Patient reports that his shoulder is not as sore anymore and HEP is going well.     Per Dr. Cash note 23: Sling was discontinued.  2 to 5 pound weight limit for the next 2 weeks.  He was instructed to still sleep in the sling for an additional 2 weeks ; he does need to work on scar tissue massage ; Formal physical therapy will continue with rotator cuff repair protocol as previously implemented as well as biceps tenodesis protocol         OBJECTIVE EXAMINATION     Observation:     Test measurements:  measure AROM/PROM NV     Test used Initial score  10/24/23 2023 11/30/23 12/14/23 12/26/23 1/2/24   Pain Summary  10 2-3/10 1-2/10 2/10 1/10   Functional questionnaire Quick DASH 84% disability 68%   50% 48%   Other:

## 2024-01-04 ENCOUNTER — HOSPITAL ENCOUNTER (OUTPATIENT)
Dept: PHYSICAL THERAPY | Age: 59
Setting detail: THERAPIES SERIES
Discharge: HOME | End: 2024-01-04
Payer: COMMERCIAL

## 2024-01-04 PROCEDURE — 97110 THERAPEUTIC EXERCISES: CPT

## 2024-01-04 PROCEDURE — 97016 VASOPNEUMATIC DEVICE THERAPY: CPT

## 2024-01-04 PROCEDURE — 97140 MANUAL THERAPY 1/> REGIONS: CPT

## 2024-01-04 NOTE — FLOWSHEET NOTE
----- Message from Ihsan Walker MD sent at 9/14/2022  2:40 PM CDT -----  So cultures are negative, and there are no cancer cells seen per the cytologic interpretation.  Good news so far pending consultation with Dr. Garica      Status: [] Progressing: [x] Met: [] Not Met: [] Adjusted  Patient will have a decrease in pain to 2/10 to help  facilitate improvement in movement, function, and ADLs as indicated by functional deficits.   Status: [] Progressing: [x] Met: [] Not Met: [] Adjusted    Long Term Goals: To be achieved in: 8-10 weeks  Disability index score of 45% or less for the Quick DASH to assist with return to prior level of function.    Status: [x] Progressing: [] Met: [] Not Met: [] Adjusted  Improve shoulder AROM to WFL to allow for proper joint functioning as indicated by patients functional deficits.  Status: [x] Progressing: [] Met: [] Not Met: [] Adjusted  Pt to improve strength to 4+/5 or better of rotator cuff, scapular retractors, shoulder elevators, biceps, and triceps to allow for proper muscle and joint use in functional mobility, ADLs and prior level of function   Status: [x] Progressing: [] Met: [] Not Met: [] Adjusted  Patient will return to Reaching activities without increased symptoms or restriction to work towards return to prior level of function.       Status: [x] Progressing: [] Met: [] Not Met: [] Adjusted  Patient will resume normal work/leisure activities without pain.            Status: [x] Progressing: [] Met: [] Not Met: [] Adjusted     Overall Progression Towards Functional goals/ Treatment Progress Update:  [x] Patient is progressing as expected towards functional goals listed.    [] Progression is slowed due to complexities/Impairments listed.  [] Progression has been slowed due to co-morbidities.  [] Plan just implemented, too soon (<30days) to assess goals progression   [] Goals require adjustment due to lack of progress  [] Patient is not progressing as expected and requires additional follow up with physician  [] Other:     CHARGE CAPTURE     CHARGE GRID   CPT Code (TIMED) minutes # CPT Code (UNTIMED) #     [x] Therex (09588)  25' 2  [] EVAL:LOW (88474 - Typically 20 minutes face-to-face)     []

## 2024-01-09 ENCOUNTER — HOSPITAL ENCOUNTER (OUTPATIENT)
Dept: PHYSICAL THERAPY | Age: 59
Setting detail: THERAPIES SERIES
Discharge: HOME | End: 2024-01-09
Payer: COMMERCIAL

## 2024-01-09 PROCEDURE — 97016 VASOPNEUMATIC DEVICE THERAPY: CPT | Performed by: PHYSICAL THERAPIST

## 2024-01-09 PROCEDURE — 97140 MANUAL THERAPY 1/> REGIONS: CPT | Performed by: PHYSICAL THERAPIST

## 2024-01-09 PROCEDURE — 97110 THERAPEUTIC EXERCISES: CPT | Performed by: PHYSICAL THERAPIST

## 2024-01-09 NOTE — FLOWSHEET NOTE
[facility]- Outpatient Rehabilitation and Therapy  [address], [place] [address] office: [phone] fax: [phone]        Physical Therapy: TREATMENT/PROGRESS NOTE   Patient: Hank Valdivia (59 y.o. male)   Treatment Date: 2024   :  1965 MRN: [id number]   Visit #: 21   Insurance Allowable Auth Needed   30 hard max []Yes    [x]No    Insurance: Payor: [employer] / Plan: [employer] - [place] PPO / Product Type: *No Product type* /   Insurance ID: [id number] - ([facility])  Secondary Insurance (if applicable):    Treatment Diagnosis:       ICD-10-CM     1. Acute pain of right shoulder  M25.511         2. Shoulder stiffness, right  M25.611         3. Localized edema  R60.0         4. Shoulder weakness  R29.898          Medical Diagnosis:    Right shoulder pain, unspecified chronicity [M25.511]   Referring Physician: Adam Cash MD  PCP: Jorge Sterling DO                             Plan of care signed (Y/N): Y    Date of Patient follow up with Physician: 23     Progress Report/POC: NO  POC update due: 24 (OR 10 visits /OR AUTH LIMITS, whichever is less)        Preferred Language for Healthcare:   []English       []other:    SUBJECTIVE EXAMINATION     Patient Report/Comments: Patient reports that his shoulder is doing pretty well. Occasional soreness and some pain if he does too much.      Per Dr. Cash note 23: Sling was discontinued.  2 to 5 pound weight limit for the next 2 weeks.  He was instructed to still sleep in the sling for an additional 2 weeks ; he does need to work on scar tissue massage ; Formal physical therapy will continue with rotator cuff repair protocol as previously implemented as well as biceps tenodesis protocol         OBJECTIVE EXAMINATION     Observation:     Test measurements:       Test used Initial score  10/24/23 2023 11/30/23 12/14/23 12/26/23 1/2/24   Pain Summary VAS 4/10 110 2-3/10 1-2/10 2/10 1/10   Functional

## 2024-01-11 ENCOUNTER — HOSPITAL ENCOUNTER (OUTPATIENT)
Dept: PHYSICAL THERAPY | Age: 59
Setting detail: THERAPIES SERIES
Discharge: HOME | End: 2024-01-11
Payer: COMMERCIAL

## 2024-01-11 PROCEDURE — 97110 THERAPEUTIC EXERCISES: CPT

## 2024-01-11 PROCEDURE — 97140 MANUAL THERAPY 1/> REGIONS: CPT

## 2024-01-11 PROCEDURE — 97016 VASOPNEUMATIC DEVICE THERAPY: CPT

## 2024-01-11 NOTE — FLOWSHEET NOTE
and progression per patient tolerance, in order to progress toward full function and prevent re-injury.    Status: [] Progressing: [x] Met: [] Not Met: [] Adjusted  Patient will have a decrease in pain to 2/10 to help  facilitate improvement in movement, function, and ADLs as indicated by functional deficits.   Status: [] Progressing: [x] Met: [] Not Met: [] Adjusted    Long Term Goals: To be achieved in: 8-10 weeks  Disability index score of 45% or less for the Quick DASH to assist with return to prior level of function.    Status: [x] Progressing: [] Met: [] Not Met: [] Adjusted  Improve shoulder AROM to WFL to allow for proper joint functioning as indicated by patients functional deficits.  Status: [x] Progressing: [] Met: [] Not Met: [] Adjusted  Pt to improve strength to 4+/5 or better of rotator cuff, scapular retractors, shoulder elevators, biceps, and triceps to allow for proper muscle and joint use in functional mobility, ADLs and prior level of function   Status: [x] Progressing: [] Met: [] Not Met: [] Adjusted  Patient will return to Reaching activities without increased symptoms or restriction to work towards return to prior level of function.       Status: [x] Progressing: [] Met: [] Not Met: [] Adjusted  Patient will resume normal work/leisure activities without pain.            Status: [x] Progressing: [] Met: [] Not Met: [] Adjusted     Overall Progression Towards Functional goals/ Treatment Progress Update:  [x] Patient is progressing as expected towards functional goals listed.    [] Progression is slowed due to complexities/Impairments listed.  [] Progression has been slowed due to co-morbidities.  [] Plan just implemented, too soon (<30days) to assess goals progression   [] Goals require adjustment due to lack of progress  [] Patient is not progressing as expected and requires additional follow up with physician  [] Other:     CHARGE CAPTURE     CHARGE GRID   CPT Code (TIMED) minutes # CPT Code

## 2024-01-16 ENCOUNTER — HOSPITAL ENCOUNTER (OUTPATIENT)
Dept: PHYSICAL THERAPY | Age: 59
Setting detail: THERAPIES SERIES
Discharge: HOME | End: 2024-01-16
Payer: COMMERCIAL

## 2024-01-16 PROCEDURE — 97110 THERAPEUTIC EXERCISES: CPT

## 2024-01-16 PROCEDURE — 97016 VASOPNEUMATIC DEVICE THERAPY: CPT

## 2024-01-16 PROCEDURE — 97140 MANUAL THERAPY 1/> REGIONS: CPT

## 2024-01-16 NOTE — FLOWSHEET NOTE
weeks  Independent in HEP and progression per patient tolerance, in order to progress toward full function and prevent re-injury.    Status: [] Progressing: [x] Met: [] Not Met: [] Adjusted  Patient will have a decrease in pain to 2/10 to help  facilitate improvement in movement, function, and ADLs as indicated by functional deficits.   Status: [] Progressing: [x] Met: [] Not Met: [] Adjusted    Long Term Goals: To be achieved in: 8-10 weeks  Disability index score of 45% or less for the Quick DASH to assist with return to prior level of function.    Status: [x] Progressing: [] Met: [] Not Met: [] Adjusted  Improve shoulder AROM to WFL to allow for proper joint functioning as indicated by patients functional deficits.  Status: [x] Progressing: [] Met: [] Not Met: [] Adjusted  Pt to improve strength to 4+/5 or better of rotator cuff, scapular retractors, shoulder elevators, biceps, and triceps to allow for proper muscle and joint use in functional mobility, ADLs and prior level of function   Status: [x] Progressing: [] Met: [] Not Met: [] Adjusted  Patient will return to Reaching activities without increased symptoms or restriction to work towards return to prior level of function.       Status: [x] Progressing: [] Met: [] Not Met: [] Adjusted  Patient will resume normal work/leisure activities without pain.            Status: [x] Progressing: [] Met: [] Not Met: [] Adjusted     Overall Progression Towards Functional goals/ Treatment Progress Update:  [x] Patient is progressing as expected towards functional goals listed.    [] Progression is slowed due to complexities/Impairments listed.  [] Progression has been slowed due to co-morbidities.  [] Plan just implemented, too soon (<30days) to assess goals progression   [] Goals require adjustment due to lack of progress  [] Patient is not progressing as expected and requires additional follow up with physician  [] Other:     CHARGE CAPTURE     CHARGE GRID   CPT Code

## 2024-01-18 ENCOUNTER — OFFICE VISIT (OUTPATIENT)
Dept: ORTHOPEDIC SURGERY | Age: 59
End: 2024-01-18

## 2024-01-18 ENCOUNTER — HOSPITAL ENCOUNTER (OUTPATIENT)
Dept: PHYSICAL THERAPY | Age: 59
Setting detail: THERAPIES SERIES
Discharge: HOME | End: 2024-01-18
Payer: COMMERCIAL

## 2024-01-18 DIAGNOSIS — Z47.89 ORTHOPEDIC AFTERCARE: Primary | ICD-10-CM

## 2024-01-18 PROCEDURE — 99024 POSTOP FOLLOW-UP VISIT: CPT | Performed by: ORTHOPAEDIC SURGERY

## 2024-01-18 PROCEDURE — 97110 THERAPEUTIC EXERCISES: CPT

## 2024-01-18 PROCEDURE — 97016 VASOPNEUMATIC DEVICE THERAPY: CPT

## 2024-01-18 PROCEDURE — 97140 MANUAL THERAPY 1/> REGIONS: CPT

## 2024-01-18 RX ORDER — MELOXICAM 15 MG/1
15 TABLET ORAL DAILY PRN
Qty: 30 TABLET | Refills: 0 | Status: SHIPPED | OUTPATIENT
Start: 2024-01-18

## 2024-01-18 NOTE — PROGRESS NOTES
POST OPERATIVE ORTHOPAEDIC NOTE    DOS: 10/18/2023  PROCEDURES: Right shoulder arthroscopic rotator cuff repair, subacromial decompression and open subpectoral biceps tenodesis    The patient has been recovering. The patient states the pain is 1-2/10 pain.  The patient has continued PT. he feels likely the slight increase in discomfort is been secondary to the very cold weather we have had recently.  Otherwise he states dramatic improvements in comparison to what his pain was preoperatively    Focused pertinent physical examination of the operative extremity:  Wounds C/D/I, well healed surgical incisions  No gross scar tissue  160 degrees forward flexion active range of motion, external rotation 40 degrees internal rotation to L4  Good cosmesis on biceps firing  Skin intact throughout  5/5 D B T G IO EPL  SILT Ax, R, U, M  +2 radial pulse     Diagnosis Orders   1. Orthopedic aftercare  meloxicam (MOBIC) 15 MG tablet        Assessment and plan: The patient is now 3 months status post the above listed procedure and is recovering    .    --Greater than 50% of the time (11/20 minutes) was spent coordinating care and discussing postoperative recovery course  -I had a pleasant discussion with the patient today.  I reviewed with him consideration for continued formal physical therapy and he will do so.  He is continue to improve his functional range of motion and he has done quite well compared to what his symptoms and pain were preoperatively as he discusses today.  -The slight discomfort I do suspect as he continues to recondition his shoulder and rotator cuff given he is only been 6 weeks out of the sling at this point.  Mobic 15 mg p.o. daily as needed pain and OTC Tylenol per bottle as needed discomfort  -Patient without significant limitations or restrictions at this point however to continue to actively engage with a home exercise program and what therapy teaches him in therapy and to use good judgment  -All

## 2024-01-18 NOTE — FLOWSHEET NOTE
expected and requires additional follow up with physician  [] Other:     CHARGE CAPTURE     CHARGE GRID   CPT Code (TIMED) minutes # CPT Code (UNTIMED) #     [x] Therex (72019)  30' 2  [] EVAL:LOW (88117 - Typically 20 minutes face-to-face)     [] Neuromusc. Re-ed (67377)    [] Re-Eval (65124)     [x] Manual (72621) 10' 1  [] Estim Unattended (94597)     [] Ther. Act (29968)    [] Mech. Traction (98763)     [] Gait (44368)    [] Dry Needle 1-2 muscle (20560)     [] Aquatic Therex (94879)    [] Dry Needle 3+ muscle (20561)     [] Iontophoresis (66689)    [x] VASO (83897) 15' 1    [] Ultrasound (81935)    [] Group Therapy (69526)     [] Estim Attended (14792)    [] Other: cold pack    Total Timed Code Tx Minutes 40' 3       Total Treatment Minutes 55'        Charge Justification:  (53877) THERAPEUTIC EXERCISE - Provided verbal/tactile cueing for activities related to strengthening, flexibility, endurance, ROM performed to prevent loss of range of motion, maintain or improve muscular strength or increase flexibility, following either an injury or surgery.   (05069) HOME EXERCISE PROGRAM- Reviewed/Progressed HEP activities related to strengthening, flexibility, endurance, ROM performed to prevent loss of range of motion, maintain or improve muscular strength or increase flexibility, following either an injury or surgery.  (23318) NEUROMUSCULAR RE-EDUCATION- Therapeutic procedure, 1 or more areas, each 15 minutes; neuromuscular reeducation of movement, balance, coordination, kinesthetic sense, posture, and/or proprioception for sitting and/or standing activities  (90398) MANUAL THERAPY-  Manual therapy techniques, 1 or more regions, each 15 minutes (Mobilization/manipulation, manual lymphatic drainage, manual traction) for the purpose of modulating pain, promoting relaxation,  increasing ROM, reducing/eliminating soft tissue swelling/inflammation/restriction, improving soft tissue extensibility and allowing for proper

## 2024-01-23 ENCOUNTER — HOSPITAL ENCOUNTER (OUTPATIENT)
Dept: PHYSICAL THERAPY | Age: 59
Setting detail: THERAPIES SERIES
Discharge: HOME | End: 2024-01-23
Payer: COMMERCIAL

## 2024-01-23 PROCEDURE — 97140 MANUAL THERAPY 1/> REGIONS: CPT

## 2024-01-23 PROCEDURE — 97110 THERAPEUTIC EXERCISES: CPT

## 2024-01-23 PROCEDURE — 97016 VASOPNEUMATIC DEVICE THERAPY: CPT

## 2024-01-23 NOTE — PLAN OF CARE
[facility]- Outpatient Rehabilitation and Therapy  [address], [place] [address] office: [phone] fax: [phone]    Physical Therapy Re-Certification Plan of Care    Dear Adam Cash MD  ,    We had the pleasure of treating the following patient for physical therapy services at [facility]. A summary of our findings can be found in the updated assessment below.  This includes our plan of care.  If you have any questions or concerns regarding these findings, please do not hesitate to contact me at the office phone number checked above.  Thank you for the referral.     Physician Signature:________________________________Date:__________________  By signing above (or electronic signature), therapist's plan is approved by physician      Functional Outcome:   Hank Valdivia 1965 continues to present with functional deficits in strength symmetry, endurance of strength, and eccentric control  limiting ability with reaching overhead, carrying items, lifting items, and pushing or pulling activity .  During therapy this date, patient required verbal cueing, progression of exercises and program, and manual interventions for exercise progression, allowing for proper ROM, and improving postural awareness. Patient will continue to benefit from ongoing evaluation and advanced clinical decision from a Physical Therapist to improve muscle strength, proper body mechanics, and high level IADLs to safely return to PLOF and recreational activity without symptoms or restrictions.    Overall Response to Treatment:   [x]Patient is responding well to treatment and improvement is noted with regards to goals   []Patient should continue to improve in reasonable time if they continue HEP   []Patient has plateaued and is no longer responding to skilled PT intervention    []Patient is getting worse and would benefit from return to referring MD   []Patient unable to adhere

## 2024-01-25 ENCOUNTER — HOSPITAL ENCOUNTER (OUTPATIENT)
Dept: PHYSICAL THERAPY | Age: 59
Setting detail: THERAPIES SERIES
Discharge: HOME | End: 2024-01-25
Payer: COMMERCIAL

## 2024-01-25 PROCEDURE — 97016 VASOPNEUMATIC DEVICE THERAPY: CPT

## 2024-01-25 PROCEDURE — 97110 THERAPEUTIC EXERCISES: CPT

## 2024-01-25 PROCEDURE — 97140 MANUAL THERAPY 1/> REGIONS: CPT

## 2024-01-25 NOTE — FLOWSHEET NOTE
[facility]- Outpatient Rehabilitation and Therapy  [address], [place] [address] office: [phone] fax: [phone]        Physical Therapy: TREATMENT/PROGRESS NOTE   Patient: Hank Valdivia (59 y.o. male)   Treatment Date: 2024   :  1965 MRN: [id number]   Visit #: 26   Insurance Allowable Auth Needed   30 hard max []Yes    [x]No    Insurance: Payor: BCBS / Plan: BCBS - [place] PPO / Product Type: *No Product type* /   Insurance ID: [id number] - ([facility])  Secondary Insurance (if applicable):    Treatment Diagnosis:       ICD-10-CM     1. Acute pain of right shoulder  M25.511         2. Shoulder stiffness, right  M25.611         3. Localized edema  R60.0         4. Shoulder weakness  R29.898          Medical Diagnosis:    Right shoulder pain, unspecified chronicity [M25.511]   Referring Physician: Adam Cash MD  PCP: Jorge Sterling DO                             Plan of care signed (Y/N): Y    Date of Patient follow up with Physician: 24     Progress Report/POC: NO  POC update due: 24 (OR 10 visits /OR AUTH LIMITS, whichever is less)      Preferred Language for Healthcare:   []English       []other:    SUBJECTIVE EXAMINATION     Patient Report/Comments: Patient reports usual soreness. He was a little more sore than usual after last session, thinks it was related to the IR stretch.    Per Dr. Cash note 23: Sling was discontinued.  2 to 5 pound weight limit for the next 2 weeks.  He was instructed to still sleep in the sling for an additional 2 weeks ; he does need to work on scar tissue massage ; Formal physical therapy will continue with rotator cuff repair protocol as previously implemented as well as biceps tenodesis protocol         OBJECTIVE EXAMINATION     Observation:     Test measurements:       Test used Initial score  10/24/23 2023 11/30/23 12/14/23 12/26/23 1/2/24 1/23/24   Pain Summary VAS 4/10 1/10 2-3/10 1-2/10 2/10 1/10

## 2024-01-30 ENCOUNTER — HOSPITAL ENCOUNTER (OUTPATIENT)
Dept: PHYSICAL THERAPY | Age: 59
Setting detail: THERAPIES SERIES
Discharge: HOME | End: 2024-01-30
Payer: COMMERCIAL

## 2024-01-30 PROCEDURE — 97140 MANUAL THERAPY 1/> REGIONS: CPT

## 2024-01-30 PROCEDURE — 97110 THERAPEUTIC EXERCISES: CPT

## 2024-01-30 NOTE — FLOWSHEET NOTE
[facility]- Outpatient Rehabilitation and Therapy  [address], [place] [address] office: [phone] fax: [phone]        Physical Therapy: TREATMENT/PROGRESS NOTE   Patient: Hank Valdivia (59 y.o. male)   Treatment Date: 2024   :  1965 MRN: [id number]   Visit #: 27   Insurance Allowable Auth Needed   30 hard max []Yes    [x]No    Insurance: Payor: BCBS / Plan: BCBS - [place] PPO / Product Type: *No Product type* /   Insurance ID: [id number] - ([facility])  Secondary Insurance (if applicable):    Treatment Diagnosis:       ICD-10-CM     1. Acute pain of right shoulder  M25.511         2. Shoulder stiffness, right  M25.611         3. Localized edema  R60.0         4. Shoulder weakness  R29.898          Medical Diagnosis:    Right shoulder pain, unspecified chronicity [M25.511]   Referring Physician: Adam Cash MD  PCP: Jorge Sterling DO                             Plan of care signed (Y/N): Y    Date of Patient follow up with Physician: 24     Progress Report/POC: NO  POC update due: 24 (OR 10 visits /OR AUTH LIMITS, whichever is less)      Preferred Language for Healthcare:   []English       []other:    SUBJECTIVE EXAMINATION     Patient Report/Comments: Patient reports usual soreness, nothing out of the ordinary and feels he continues to progress.            OBJECTIVE EXAMINATION     Observation:     Test measurements:       Test used Initial score  10/24/23 2023 11/30/23 12/14/23 12/26/23 1/2/24 1/23/24   Pain Summary VAS 4/10 1/10 2-3/10 1-2/10 2/10 1/10 1/10 \"sore\"   Functional questionnaire Quick DASH 84% disability 68%   50% 48% 30%   Other:                              PROM L PROM R 23 PROM 23 PROM 23 PROM 23 PROM 23 PROM R  AROM R 24  AROM R 24                            SHOULDER Flexion  122 125 130 152 160 (blocking scapula)    150 155    Abduction    120 120 120     150 160    ER -0

## 2024-02-01 ENCOUNTER — HOSPITAL ENCOUNTER (OUTPATIENT)
Dept: PHYSICAL THERAPY | Age: 59
Setting detail: THERAPIES SERIES
Discharge: HOME | End: 2024-02-01
Payer: COMMERCIAL

## 2024-02-01 PROCEDURE — 97110 THERAPEUTIC EXERCISES: CPT

## 2024-02-01 PROCEDURE — 97140 MANUAL THERAPY 1/> REGIONS: CPT

## 2024-02-01 NOTE — FLOWSHEET NOTE
co-morbidities.  [] Plan just implemented, too soon (<30days) to assess goals progression   [] Goals require adjustment due to lack of progress  [] Patient is not progressing as expected and requires additional follow up with physician  [] Other:     CHARGE CAPTURE     CHARGE GRID   CPT Code (TIMED) minutes # CPT Code (UNTIMED) #     [x] Therex (27325)  35' 2  [] EVAL:LOW (17054 - Typically 20 minutes face-to-face)     [] Neuromusc. Re-ed (49912) 5   [] Re-Eval (85663)     [x] Manual (76978) 10' 1  [] Estim Unattended (87007)     [] Ther. Act (26305)    [] Mech. Traction (11962)     [] Gait (94808)    [] Dry Needle 1-2 muscle (20560)     [] Aquatic Therex (22834)    [] Dry Needle 3+ muscle (20561)     [] Iontophoresis (50268)    [] VASO (10527) 15'     [] Ultrasound (83234)    [] Group Therapy (00070)     [] Estim Attended (30395)    [] Other: cold pack    Total Timed Code Tx Minutes 50' 3       Total Treatment Minutes 50'        Charge Justification:  (19714) THERAPEUTIC EXERCISE - Provided verbal/tactile cueing for activities related to strengthening, flexibility, endurance, ROM performed to prevent loss of range of motion, maintain or improve muscular strength or increase flexibility, following either an injury or surgery.   (13157) HOME EXERCISE PROGRAM- Reviewed/Progressed HEP activities related to strengthening, flexibility, endurance, ROM performed to prevent loss of range of motion, maintain or improve muscular strength or increase flexibility, following either an injury or surgery.  (34351) NEUROMUSCULAR RE-EDUCATION- Therapeutic procedure, 1 or more areas, each 15 minutes; neuromuscular reeducation of movement, balance, coordination, kinesthetic sense, posture, and/or proprioception for sitting and/or standing activities  (17449) MANUAL THERAPY-  Manual therapy techniques, 1 or more regions, each 15 minutes (Mobilization/manipulation, manual lymphatic drainage, manual traction) for the purpose of

## 2024-02-08 ENCOUNTER — HOSPITAL ENCOUNTER (OUTPATIENT)
Dept: PHYSICAL THERAPY | Age: 59
Setting detail: THERAPIES SERIES
Discharge: HOME | End: 2024-02-08
Payer: COMMERCIAL

## 2024-02-08 PROCEDURE — 97110 THERAPEUTIC EXERCISES: CPT

## 2024-02-08 PROCEDURE — 97140 MANUAL THERAPY 1/> REGIONS: CPT

## 2024-02-08 NOTE — FLOWSHEET NOTE
(<30days) to assess goals progression   [] Goals require adjustment due to lack of progress  [] Patient is not progressing as expected and requires additional follow up with physician  [] Other:     CHARGE CAPTURE     CHARGE GRID   CPT Code (TIMED) minutes # CPT Code (UNTIMED) #     [x] Therex (44890)  30' 2  [] EVAL:LOW (13236 - Typically 20 minutes face-to-face)     [] Neuromusc. Re-ed (11461)    [] Re-Eval (59288)     [x] Manual (00651) 12' 1  [] Estim Unattended (02954)     [] Ther. Act (41440)    [] Mech. Traction (99251)     [] Gait (90808)    [] Dry Needle 1-2 muscle (20560)     [] Aquatic Therex (73574)    [] Dry Needle 3+ muscle (20561)     [] Iontophoresis (84183)    [] VASO (79024) 15'     [] Ultrasound (15610)    [] Group Therapy (06787)     [] Estim Attended (59150)    [] Other: cold pack    Total Timed Code Tx Minutes 42' 3       Total Treatment Minutes 42        Charge Justification:  (02691) THERAPEUTIC EXERCISE - Provided verbal/tactile cueing for activities related to strengthening, flexibility, endurance, ROM performed to prevent loss of range of motion, maintain or improve muscular strength or increase flexibility, following either an injury or surgery.   (17829) HOME EXERCISE PROGRAM- Reviewed/Progressed HEP activities related to strengthening, flexibility, endurance, ROM performed to prevent loss of range of motion, maintain or improve muscular strength or increase flexibility, following either an injury or surgery.  (22210) NEUROMUSCULAR RE-EDUCATION- Therapeutic procedure, 1 or more areas, each 15 minutes; neuromuscular reeducation of movement, balance, coordination, kinesthetic sense, posture, and/or proprioception for sitting and/or standing activities  (22469) MANUAL THERAPY-  Manual therapy techniques, 1 or more regions, each 15 minutes (Mobilization/manipulation, manual lymphatic drainage, manual traction) for the purpose of modulating pain, promoting relaxation,  increasing ROM,

## 2024-02-15 ENCOUNTER — HOSPITAL ENCOUNTER (OUTPATIENT)
Dept: PHYSICAL THERAPY | Age: 59
Setting detail: THERAPIES SERIES
Discharge: HOME | End: 2024-02-15
Payer: COMMERCIAL

## 2024-02-15 PROCEDURE — 97110 THERAPEUTIC EXERCISES: CPT

## 2024-02-15 PROCEDURE — 97140 MANUAL THERAPY 1/> REGIONS: CPT

## 2024-02-15 NOTE — FLOWSHEET NOTE
[facility]- Outpatient Rehabilitation and Therapy  [address], [place] [address] office: [phone] fax: [phone]      Physical Therapy: TREATMENT/PROGRESS NOTE   Patient: Hank Valdivia (59 y.o. male)   Treatment Date: 02/15/2024   :  1965 MRN: [id number]   Visit #: 30  (3/5)  Insurance Allowable Auth Needed   5 visits 24-3/31/24 [x]Yes    []No    Insurance: Payor: BC / Plan: BCBS - [place] PPO / Product Type: *No Product type* /   Insurance ID: [id number] - ([facility])  Secondary Insurance (if applicable):    Treatment Diagnosis:       ICD-10-CM     1. Acute pain of right shoulder  M25.511         2. Shoulder stiffness, right  M25.611         3. Localized edema  R60.0         4. Shoulder weakness  R29.898          Medical Diagnosis:    Right shoulder pain, unspecified chronicity [M25.511]   Referring Physician: Adam Cash MD  PCP: Jorge Sterling DO                             Plan of care signed (Y/N): Y    Date of Patient follow up with Physician: 24     Progress Report/POC: NO  POC update due: 24 (OR 10 visits /OR AUTH LIMITS, whichever is less)      Preferred Language for Healthcare:   []English       []other:    SUBJECTIVE EXAMINATION     Patient Report/Comments: Patient reports shoulder feeling more stiff than usual, he got busy and didn't do exercises as often.             OBJECTIVE EXAMINATION     Observation:     Test measurements:       Test used Initial score  10/24/23 2023 11/30/23 12/14/23 12/26/23 1/2/24 1/23/24   Pain Summary VAS 4/10 1/10 2-3/10 1-2/10 2/10 1/10 1/10 \"sore\"   Functional questionnaire Quick DASH 84% disability 68%   50% 48% 30%   Other:                              PROM L PROM R 23 PROM 23 PROM 23 PROM 23 PROM 23 PROM R  AROM R 24  AROM R 24 AROM R 2/15/24                             SHOULDER Flexion  122 125 130 152 160 (blocking scapula)    150 155 165    Abduction

## 2024-02-22 ENCOUNTER — HOSPITAL ENCOUNTER (OUTPATIENT)
Dept: PHYSICAL THERAPY | Age: 59
Setting detail: THERAPIES SERIES
Discharge: HOME | End: 2024-02-22
Payer: COMMERCIAL

## 2024-02-22 PROCEDURE — 97140 MANUAL THERAPY 1/> REGIONS: CPT

## 2024-02-22 PROCEDURE — 97110 THERAPEUTIC EXERCISES: CPT

## 2024-02-22 NOTE — PLAN OF CARE
2x15     2x10    ER supported at 90  3# 2x15    Functional lift low to high diagonal 4# 10x ea    Body blade 90/90 10x       Progress to ball toss rebounder, med ball lifts from low to high, wall walks to quadruped/table, body blade   Manual Intervention (02051)  TIME         Grades III, IV GH mobs post, inf, PROM shoulder flex/abd/ER/IR   8'                   Therapeutic Activity (65203)  Sets/time                                    Modalities:     Applied to Shoulder Right for significant edema, swelling, pain control for 15 minutes.  Relevant ICD-10 R60.9 Edema unspecified.     Education/Home Exercise Program: Patient HEP program created electronically.  Refer to [employer] access code:    Access Code: [email or web address]  URL: [email or web address]  Date: 10/24/2023  Prepared by: Ana Montoya    Exercises  - Circular Shoulder Pendulum with Table Support  - 2-3 x daily - 7 x weekly - 10 reps  - Flexion-Extension Shoulder Pendulum with Table Support  - 2-3 x daily - 7 x weekly - 10 reps  - Seated Elbow Flexion and Extension AROM  - 4-6 x daily - 7 x weekly - 3 sets - 10 reps  - Wrist Flexion AROM  - 4-6 x daily - 7 x weekly - 30 reps  - Wrist Extension AROM  - 4-6 x daily - 7 x weekly - 30 reps  - Seated Gripping Towel  - 4-6 x daily - 7 x weekly - 30 reps    Access Code: [email or web address]  URL: [email or web address]  Date: 11/30/2023  Prepared by: Ana Montoya    Exercises  - Supine Shoulder Flexion Extension AAROM with Dowel  - 2 x daily - 7 x weekly - 2 sets - 10 reps - 5 seconds hold  - Supine Shoulder External Rotation with Dowel  - 2 x daily - 7 x weekly - 2 sets - 10 reps - 5 hold  - Prone Shoulder Row  - 2 x daily - 7 x weekly - 2 sets - 10 reps  - Seated Shoulder Flexion Slide at Table Top with Forearm in Neutral  - 2 x daily - 7 x weekly - 2 sets - 10 reps - 5 seconds hold  - Shoulder External Rotation Reactive Isometrics  - 1 x daily - 7 x weekly - 2 sets - 10 reps  - Shoulder Internal Rotation Reactive

## 2024-02-29 ENCOUNTER — APPOINTMENT (OUTPATIENT)
Dept: PHYSICAL THERAPY | Age: 59
End: 2024-02-29
Payer: COMMERCIAL

## 2024-03-07 ENCOUNTER — HOSPITAL ENCOUNTER (OUTPATIENT)
Dept: PHYSICAL THERAPY | Age: 59
Setting detail: THERAPIES SERIES
Discharge: HOME | End: 2024-03-07
Payer: COMMERCIAL

## 2024-03-07 PROCEDURE — 97140 MANUAL THERAPY 1/> REGIONS: CPT

## 2024-03-07 PROCEDURE — 97110 THERAPEUTIC EXERCISES: CPT

## 2024-03-07 NOTE — PLAN OF CARE
due to co-morbidities.  [] Plan just implemented, too soon (<30days) to assess goals progression   [] Goals require adjustment due to lack of progress  [] Patient is not progressing as expected and requires additional follow up with physician  [] Other:     CHARGE CAPTURE     CHARGE GRID   CPT Code (TIMED) minutes # CPT Code (UNTIMED) #     [x] Therex (89426)  35' 2  [] EVAL:LOW (72479 - Typically 20 minutes face-to-face)     [] Neuromusc. Re-ed (46511)    [] Re-Eval (19361)     [x] Manual (98460) 5' 1  [] Estim Unattended (88272)     [] Ther. Act (83925)    [] Mech. Traction (14220)     [] Gait (04692)    [] Dry Needle 1-2 muscle (20560)     [] Aquatic Therex (97113)    [] Dry Needle 3+ muscle (20561)     [] Iontophoresis (00264)    [] VASO (85245) 15'     [] Ultrasound (41800)    [] Group Therapy (89499)     [] Estim Attended (14627)    [] Other: cold pack    Total Timed Code Tx Minutes 40' 3       Total Treatment Minutes 40'        Charge Justification:  (24192) THERAPEUTIC EXERCISE - Provided verbal/tactile cueing for activities related to strengthening, flexibility, endurance, ROM performed to prevent loss of range of motion, maintain or improve muscular strength or increase flexibility, following either an injury or surgery.   (28728) HOME EXERCISE PROGRAM- Reviewed/Progressed HEP activities related to strengthening, flexibility, endurance, ROM performed to prevent loss of range of motion, maintain or improve muscular strength or increase flexibility, following either an injury or surgery.  (52956) NEUROMUSCULAR RE-EDUCATION- Therapeutic procedure, 1 or more areas, each 15 minutes; neuromuscular reeducation of movement, balance, coordination, kinesthetic sense, posture, and/or proprioception for sitting and/or standing activities  (99841) MANUAL THERAPY-  Manual therapy techniques, 1 or more regions, each 15 minutes (Mobilization/manipulation, manual lymphatic drainage, manual traction) for the purpose of

## 2024-03-19 ENCOUNTER — OFFICE VISIT (OUTPATIENT)
Dept: FAMILY MEDICINE CLINIC | Age: 59
End: 2024-03-19
Payer: COMMERCIAL

## 2024-03-19 VITALS
SYSTOLIC BLOOD PRESSURE: 112 MMHG | OXYGEN SATURATION: 98 % | BODY MASS INDEX: 26.96 KG/M2 | WEIGHT: 162 LBS | HEART RATE: 98 BPM | DIASTOLIC BLOOD PRESSURE: 80 MMHG

## 2024-03-19 DIAGNOSIS — R05.3 PERSISTENT COUGH: Primary | ICD-10-CM

## 2024-03-19 PROCEDURE — 99213 OFFICE O/P EST LOW 20 MIN: CPT | Performed by: STUDENT IN AN ORGANIZED HEALTH CARE EDUCATION/TRAINING PROGRAM

## 2024-03-19 SDOH — ECONOMIC STABILITY: INCOME INSECURITY: HOW HARD IS IT FOR YOU TO PAY FOR THE VERY BASICS LIKE FOOD, HOUSING, MEDICAL CARE, AND HEATING?: NOT HARD AT ALL

## 2024-03-19 SDOH — ECONOMIC STABILITY: FOOD INSECURITY: WITHIN THE PAST 12 MONTHS, YOU WORRIED THAT YOUR FOOD WOULD RUN OUT BEFORE YOU GOT MONEY TO BUY MORE.: NEVER TRUE

## 2024-03-19 SDOH — ECONOMIC STABILITY: FOOD INSECURITY: WITHIN THE PAST 12 MONTHS, THE FOOD YOU BOUGHT JUST DIDN'T LAST AND YOU DIDN'T HAVE MONEY TO GET MORE.: NEVER TRUE

## 2024-03-19 SDOH — ECONOMIC STABILITY: HOUSING INSECURITY
IN THE LAST 12 MONTHS, WAS THERE A TIME WHEN YOU DID NOT HAVE A STEADY PLACE TO SLEEP OR SLEPT IN A SHELTER (INCLUDING NOW)?: NO

## 2024-03-19 NOTE — PROGRESS NOTES
Assessment:  Encounter Diagnosis   Name Primary?    Persistent cough Yes       Plan:  1. Persistent cough  Lungs CTA B/L.  Cough dry and none persistent.  Recommended restarting previous nasal sprays including Flonase and Astelin.  Continue for 1 week and if not improving then can consider steroid burst.      No follow-ups on file.      Patient: Hank Valdivia is a 59 y.o. male who presents today with the following Chief Complaint(s):  Chief Complaint   Patient presents with    bronchitis    Sinus Problem    Cough     4 weeks ,not getting better         HPI    Reports bad cold about 3 weeks ago  Fatigue, slept 20 hours about 3 weeks ago  Has had mild symptoms persistent for 3 weeks  Dry cough x 3 weeks  Some relief laying down  Has not lost voice  No sore throat  Mild sinus drainage  Itchy eyes- using eye drops  Has not tried flonase or astelin consistently during this time  Has gained 20 lbs in last 4 months    Current Outpatient Medications   Medication Sig Dispense Refill    azelastine (ASTELIN) 0.1 % nasal spray 1 spray by Nasal route 2 times daily Use in each nostril as directed 30 mL 4    fluticasone (FLONASE) 50 MCG/ACT nasal spray 2 sprays by Each Nostril route in the morning and at bedtime 48 g 4    econazole nitrate 1 % cream Apply topically daily Apply topically daily.      Hydrocortisone Butyrate 0.1 % CREA Apply topically daily       naphazoline-pheniramine (NAPHCON-A) 0.025-0.3 % ophthalmic solution Place 1 drop into both eyes as needed      cetirizine (ZYRTEC) 10 MG tablet Take 1 tablet by mouth as needed      meloxicam (MOBIC) 15 MG tablet Take 1 tablet by mouth daily as needed for Pain (Patient not taking: Reported on 3/19/2024) 30 tablet 0     No current facility-administered medications for this visit.       Patient's past medical history, surgical history, family history, medications,  andallergies  were all reviewed and updated as appropriate today.      Review of Systems  All other systems

## 2024-03-20 ENCOUNTER — HOSPITAL ENCOUNTER (OUTPATIENT)
Dept: PHYSICAL THERAPY | Age: 59
Setting detail: THERAPIES SERIES
Discharge: HOME | End: 2024-03-20
Payer: COMMERCIAL

## 2024-03-20 PROCEDURE — 97110 THERAPEUTIC EXERCISES: CPT

## 2024-03-20 PROCEDURE — 97140 MANUAL THERAPY 1/> REGIONS: CPT

## 2024-03-20 NOTE — FLOWSHEET NOTE
[facility]- Outpatient Rehabilitation and Therapy  [address], [place] [address] office: [phone] fax: [phone]      Physical Therapy: TREATMENT/PROGRESS NOTE   Patient: Hank Valdivia (59 y.o. male)   Treatment Date: 2024   :  1965 MRN: [id number]   Visit #: 33  ()  Insurance Allowable Auth Needed   4 addl visits 3/8-24   [x]Yes    []No    Insurance: Payor: BC / Plan: BCBS - [place] PPO / Product Type: *No Product type* /   Insurance ID: [id number] - ([facility])  Secondary Insurance (if applicable):    Treatment Diagnosis:       ICD-10-CM     1. Acute pain of right shoulder  M25.511         2. Shoulder stiffness, right  M25.611         3. Localized edema  R60.0         4. Shoulder weakness  R29.898          Medical Diagnosis:    Right shoulder pain, unspecified chronicity [M25.511]   Referring Physician: Adam Cash MD  PCP: Jorge Sterling DO                             Plan of care signed (Y/N): Y    Date of Patient follow up with Physician: 24     Progress Report/POC: NO  POC update due: 24 (OR 10 visits /OR AUTH LIMITS, whichever is less)      Preferred Language for Healthcare:   []English       []other:    SUBJECTIVE EXAMINATION     Patient Report/Comments: Pt reports shoulder feeling good and he is using it more and more in everyday activities. No pain, just stiffness and he notices this more when he doesn't do his exercises.             OBJECTIVE EXAMINATION     Observation:     Test measurements:       Test used Initial score  10/24/23 2023 11/30/23 12/14/23 12/26/23 1/2/24 1/23/24 2/22/24 3/7/24   Pain Summary VAS 4/10 1/10 2-3/10 1-2/10 2/10 1/10 1/10 \"sore\" 0/10 at rest 0/10   Functional questionnaire Quick DASH 84% disability 68%   50% 48% 30% 11% 7%   Other:                                  PROM L PROM R 23 PROM 23 PROM 23 PROM 23 PROM 23 PROM R  AROM R 24  AROM R 24 AROM R 2/15/24

## 2024-03-27 ENCOUNTER — HOSPITAL ENCOUNTER (OUTPATIENT)
Dept: PHYSICAL THERAPY | Age: 59
Setting detail: THERAPIES SERIES
Discharge: HOME | End: 2024-03-27
Payer: COMMERCIAL

## 2024-03-27 PROCEDURE — 97140 MANUAL THERAPY 1/> REGIONS: CPT

## 2024-03-27 PROCEDURE — 97110 THERAPEUTIC EXERCISES: CPT

## 2024-03-27 NOTE — FLOWSHEET NOTE
shoulder   Bicep curl CC 10# 2x10     2x10    ER supported at 90  3# 2x15    Functional lift low to high diagonal 8# 15x ea    Body blade   Short and long lever flex 90   and long lever scap 90    D2 pattern  2x30\" ea              5x       Progress to ball toss rebounder, med ball lifts from low to high, wall walks to quadruped/table, body blade   Manual Intervention (81825)  TIME         Grades III, IV GH mobs post, inf, PROM shoulder flex/abd/ER/IR   10'                   Therapeutic Activity (14362)  Sets/time                                    Modalities:       Education/Home Exercise Program: Patient HEP program created electronically.  Refer to [employer] access code:    Access Code: [email or web address]  URL: [email or web address]  Date: 10/24/2023  Prepared by: Ana Montoya    Access Code: [email or web address]  URL: [email or web address]  Date: 11/30/2023  Prepared by: Ana Montoya    Access Code: [email or web address]  URL: [email or web address]  Date: 12/19/2023  Prepared by: Ana Montoya    Access Code: [email or web address]  URL: [email or web address]  Date: 12/26/2023  Prepared by: Ana Montoya      Access Code: [email or web address]  URL: [email or web address]  Date: 02/01/2024  Prepared by: Ana Montoya  Access Code: [email or web address]  URL: [email or web address]  Date: 03/07/2024  Prepared by: Ana Montoya    Exercises  - Sleeper Stretch  - 1 x daily - 7 x weekly - 10 reps - 10 seconds hold  - Standing Shoulder Internal Rotation Stretch with Towel  - 1 x daily - 7 x weekly - 3 sets - 10 reps  - Forearm Walks on Wall with Resistance Band  - 1 x daily - 7 x weekly - 2 sets - 15 reps  - Wall Clock with Theraband  - 1 x daily - 7 x weekly - 3 sets - 10 reps  - Standing Single Arm Elbow Flexion with Resistance  - 1 x daily - 7 x weekly - 2 sets - 15 reps  - Standing Shoulder External Rotation at 90 Degrees Abduction with Dumbbell  - 1 x daily - 7 x weekly - 2 sets - 10 reps  - Standing Ball Toss at Wall  - 1 x daily - 7 x weekly -

## 2024-03-28 ENCOUNTER — TELEPHONE (OUTPATIENT)
Dept: FAMILY MEDICINE CLINIC | Age: 59
End: 2024-03-28

## 2024-03-28 DIAGNOSIS — J40 BRONCHITIS: Primary | ICD-10-CM

## 2024-03-28 RX ORDER — METHYLPREDNISOLONE 4 MG/1
TABLET ORAL
Qty: 21 TABLET | Refills: 0 | Status: SHIPPED | OUTPATIENT
Start: 2024-03-28 | End: 2024-04-03

## 2024-03-28 NOTE — TELEPHONE ENCOUNTER
Patient called the office stating that his cough is not getting any better. The cough is mostly dry. No fevers. He was seen on 3/19/24 for this, no improvement with his cough since. Allergy symptoms have resolved with the Flonase. [facility].  Please advise, thanks.

## 2024-04-04 ENCOUNTER — HOSPITAL ENCOUNTER (OUTPATIENT)
Dept: PHYSICAL THERAPY | Age: 59
Setting detail: THERAPIES SERIES
Discharge: HOME | End: 2024-04-04
Payer: COMMERCIAL

## 2024-04-04 PROCEDURE — 97140 MANUAL THERAPY 1/> REGIONS: CPT

## 2024-04-04 PROCEDURE — 97110 THERAPEUTIC EXERCISES: CPT

## 2024-04-04 NOTE — DISCHARGE SUMMARY
abduction 170, IR to L4 to allow for proper joint functioning as indicated by patients functional deficits.  Status: [] Progressing: [x] Met: [] Not Met: [] Adjusted   Pt to improve strength to 4+/5 or better of rotator cuff, scapular retractors, shoulder elevators, biceps, and triceps to allow for proper muscle and joint use in functional mobility, ADLs and prior level of function   Status: [] Progressing: [x] Met: [] Not Met: [] Adjusted  Patient will return to Reaching activities without increased symptoms or restriction to work towards return to prior level of function.       Status: [] Progressing: [x] Met: [] Not Met: [] Adjusted  Patient will resume lifting 20 pounds without pain or restriction.            Status: [] Progressing: [x] Met: [] Not Met: [] Adjusted     Overall Progression Towards Functional goals/ Treatment Progress Update:  [x] Patient is progressing as expected towards functional goals listed.    [] Progression is slowed due to complexities/Impairments listed.  [] Progression has been slowed due to co-morbidities.  [] Plan just implemented, too soon (<30days) to assess goals progression   [] Goals require adjustment due to lack of progress  [] Patient is not progressing as expected and requires additional follow up with physician  [] Other:     CHARGE CAPTURE     CHARGE GRID   CPT Code (TIMED) minutes # CPT Code (UNTIMED) #     [x] Therex (49124)  30' 2  [] EVAL:LOW (01788 - Typically 20 minutes face-to-face)     [] Neuromusc. Re-ed (08457)    [] Re-Eval (61461)     [x] Manual (64238) 8' 1  [] Estim Unattended (21623)     [] Ther. Act (53751)    [] Mech. Traction (66283)     [] Gait (85269)    [] Dry Needle 1-2 muscle (27013)     [] Aquatic Therex (80883)    [] Dry Needle 3+ muscle (71791)     [] Iontophoresis (66216)    [] VASO (43625) 15'     [] Ultrasound (77898)    [] Group Therapy (40035)     [] Estim Attended (83147)    [] Other: cold pack    Total Timed Code Tx Minutes 38' 3       Total

## 2024-04-18 ENCOUNTER — OFFICE VISIT (OUTPATIENT)
Dept: ORTHOPEDIC SURGERY | Age: 59
End: 2024-04-18
Payer: COMMERCIAL

## 2024-04-18 VITALS — WEIGHT: 162 LBS | BODY MASS INDEX: 26.99 KG/M2 | HEIGHT: 65 IN

## 2024-04-18 DIAGNOSIS — Z98.890 STATUS POST SHOULDER SURGERY: Primary | ICD-10-CM

## 2024-04-18 PROCEDURE — 99213 OFFICE O/P EST LOW 20 MIN: CPT | Performed by: ORTHOPAEDIC SURGERY

## 2024-04-18 NOTE — PROGRESS NOTES
POST OPERATIVE ORTHOPAEDIC NOTE    DOS: 10/18/24  PROCEDURES: Right shoulder arthroscopic rotator cuff repair, subacromial decompression and open subpectoral biceps tenodesis    The patient has been recovering. The patient states the pain is 0/10 pain.  The patient has continued home exercise program taught by PT. he is pleased with the results of the procedure thus far and denies pain as he had preoperatively completed    Focused pertinent physical examination of the operative extremity:  Wounds C/D/I, well healed surgical incisions  180 degrees forward flexion/abduction active range of motion and active external rotation 40 degrees with internal rotation L2  5/5 rotator cuff testing throughout  Good cosmesis on biceps firing  Skin intact throughout  5/5 D B T G IO EPL  SILT Ax, R, U, M  +2 radial pulse     Diagnosis Orders   1. Status post shoulder surgery            Assessment and plan: The patient is now 6 months status post the above listed procedure and is recovering    .    --Greater than 50% of the time (11/20 minutes) was spent coordinating care and discussing postoperative recovery course  -I had a pleasant discussion with the patient today.  Currently he has done quite well with his overall functional return and he is pleased with the results thus far as am I.  His rotator cuff testing and overall clinical examination is well-appearing.  He has returned to functional activity and states no significant pain and only very mild discomfort occasionally with certain range of motion actions  -OTC Tylenol Aleve per bottle as needed discomfort  -Activity modifications to include low impact.  He will also continue his home exercise program.  -All questions answered to the patient's satisfaction and the patient expressed understanding and agreement with the above listed treatment plan  -Follow up in as needed  -Thank you for the clinical consultation and allowing me to participate in the patient's

## (undated) DEVICE — DRAPE BEACH CHAIR SHOULDER W/ POUCH 172X100 IN

## (undated) DEVICE — SUTURE PDS + SZ 0 L27IN ABSRB VLT L26MM CT 2 1 2 CIR PDP334H

## (undated) DEVICE — PAD,ABDOMINAL,8"X10",ST,LF: Brand: MEDLINE

## (undated) DEVICE — PACK PROCEDURE SURG ARTHSCP CUST

## (undated) DEVICE — STANDARD HYPODERMIC NEEDLE,POLYPROPYLENE HUB: Brand: MONOJECT

## (undated) DEVICE — ELECTRODE PT RET AD L9FT HI MOIST COND ADH HYDRGEL CORDED

## (undated) DEVICE — TUBE IRRIG L8IN LNG PT W/ CONN FOR PMP SYS REDEUCE

## (undated) DEVICE — SYRINGE MED 50ML LUERLOCK TIP

## (undated) DEVICE — SUTURE MCRYL SZ 3-0 L27IN ABSRB UD PS-2 3/8 CIR REV CUT NDL MCP427H

## (undated) DEVICE — GLOVE SURG SZ 8 L12IN FNGR THK79MIL GRN LTX FREE

## (undated) DEVICE — 3M™ STERI-DRAPE™ U-DRAPE, LONG 1019: Brand: STERI-DRAPE™

## (undated) DEVICE — TOWEL,OR,DSP,ST,BLUE,STD,8/PK,10PK/CS: Brand: MEDLINE

## (undated) DEVICE — SYRINGE MED 10ML LUERLOCK TIP W/O SFTY DISP

## (undated) DEVICE — SWITCH DRAPE TENET 7633

## (undated) DEVICE — ELECTRODE,RADIOTRANSLUCENT,FOAM,3PK: Brand: MEDLINE

## (undated) DEVICE — DYONICS 4.0 MM ELITE                                    ACROMIOBLASTER STRAIGHT DISPOSABLE                                    BURRS, SAGE GREEN, 10000 MAXIMUM                                    RPM, PACKAGED 6 PER BOX, STERILE

## (undated) DEVICE — STERILE POLYISOPRENE POWDER-FREE SURGICAL GLOVES: Brand: PROTEXIS

## (undated) DEVICE — SHOULDER STABILIZATION KIT,                                    DISPOSABLE 12 PER BOX

## (undated) DEVICE — SOLUTION IRRIG 500ML 0.9% SOD CHLO USP POUR PLAS BTL

## (undated) DEVICE — TRAP POLYP ETRAP

## (undated) DEVICE — CANNULA ARTHSCP L4CM DIA8MM PASSPRT BTTN

## (undated) DEVICE — TUBING PMP L8FT LNG W/ CONN FOR AR-6400 REDEUCE

## (undated) DEVICE — SNARE VASC L240CM LOOP W10MM SHTH DIA2.4MM RND STIFF CLD

## (undated) DEVICE — BLADE ES ELASTOMERIC COAT INSUL DURABLE BEND UPTO 90DEG

## (undated) DEVICE — 40763 BEACH CHAIR HEAD RESTRAINT: Brand: 40763 BEACH CHAIR HEAD RESTRAINT

## (undated) DEVICE — 3M™ IOBAN™ 2 ANTIMICROBIAL INCISE DRAPE 6650EZ: Brand: IOBAN™ 2

## (undated) DEVICE — DRESSING,GAUZE,XEROFORM,CURAD,1"X8",ST: Brand: CURAD

## (undated) DEVICE — NEEDLE SUT PASS FOR ROT CUF LABRAL REP MULTFI SCORPION

## (undated) DEVICE — GAUZE,SPONGE,4"X4",16PLY,STRL,LF,10/TRAY: Brand: MEDLINE

## (undated) DEVICE — GLOVE,SURG,SENSICARE,ALOE,LF,PF,7: Brand: MEDLINE

## (undated) DEVICE — SUTURE FIBERWIRE SZ 2 L38IN NONABSORBABLE BLU WHT BLK AR7201

## (undated) DEVICE — SOLUTION IV IRRIG 500ML 0.9% SODIUM CHL 2F7123

## (undated) DEVICE — SOLUTION IRRIG 5L LAC R BG

## (undated) DEVICE — GLOVE SURG SZ 7 L12IN FNGR THK79MIL GRN LTX FREE

## (undated) DEVICE — KIT DISP W/ SPEAR TRCR TIP OBT AND 2.6MM DRL FOR 2.6

## (undated) DEVICE — GLOVE SURG SZ 8 L12IN FNGR THK94MIL STD WHT LTX FREE

## (undated) DEVICE — SUTURE STRATAFIX SPRL SZ 3-0 L12IN ABSRB UD FS-1 L30X30CM SXMP2B410

## (undated) DEVICE — GOWN,SIRUS,NON REINFRCD,LARGE,SET IN SL: Brand: MEDLINE

## (undated) DEVICE — WEREWOLF FLOW 90 COBLATION WAND: Brand: COBLATION

## (undated) DEVICE — APPLICATOR MEDICATED 10.5 CC SOLUTION HI LT ORNG CHLORAPREP

## (undated) DEVICE — SUTURE ETHLN SZ 3-0 L18IN NONABSORBABLE BLK L24MM PS-1 3/8 1663G

## (undated) DEVICE — 4.5 MM FULL RADIUS STRAIGHT                                    BLADES, POWER/EP-1, YELLOW, PACKAGED                                    6 PER BOX, STERILE: Brand: DYONICS

## (undated) DEVICE — GLOVE SURG SZ 65 L12IN FNGR THK94MIL STD WHT LTX FREE

## (undated) DEVICE — ENDO CARRY-ON PROCEDURE KIT INCLUDES SUCTION TUBING, LUBRICANT, GAUZE, BIOHAZARD STICKER, TRANSPORT PAD AND INTERCEPT BEDSIDE KIT.: Brand: ENDO CARRY-ON PROCEDURE KIT

## (undated) DEVICE — GLOVE SURG SZ 65 L12IN FNGR THK79MIL GRN LTX FREE

## (undated) DEVICE — DRAPE,U/ SHT,SPLIT,PLAS,STERIL: Brand: MEDLINE